# Patient Record
Sex: FEMALE | Race: ASIAN | NOT HISPANIC OR LATINO | Employment: UNEMPLOYED | URBAN - METROPOLITAN AREA
[De-identification: names, ages, dates, MRNs, and addresses within clinical notes are randomized per-mention and may not be internally consistent; named-entity substitution may affect disease eponyms.]

---

## 2019-01-04 ENCOUNTER — WALK IN (OUTPATIENT)
Dept: URGENT CARE | Age: 39
End: 2019-01-04

## 2019-01-04 DIAGNOSIS — Z23 FLU VACCINE NEED: Primary | ICD-10-CM

## 2019-01-04 PROCEDURE — 90471 IMMUNIZATION ADMIN: CPT | Performed by: REGISTERED NURSE

## 2019-01-04 PROCEDURE — 90688 IIV4 VACCINE SPLT 0.5 ML IM: CPT | Performed by: REGISTERED NURSE

## 2021-10-20 ENCOUNTER — HOSPITAL ENCOUNTER (EMERGENCY)
Facility: HOSPITAL | Age: 41
Discharge: STILL ADMITTED OR ADMINISTRATIVE | End: 2021-10-21

## 2021-10-20 ENCOUNTER — HOSPITAL ENCOUNTER (EMERGENCY)
Facility: HOSPITAL | Age: 41
Discharge: HOME | End: 2021-10-21
Attending: STUDENT IN AN ORGANIZED HEALTH CARE EDUCATION/TRAINING PROGRAM
Payer: OTHER GOVERNMENT

## 2021-10-20 ENCOUNTER — APPOINTMENT (OUTPATIENT)
Dept: MRI IMAGING | Facility: HOSPITAL | Age: 41
End: 2021-10-20
Attending: STUDENT IN AN ORGANIZED HEALTH CARE EDUCATION/TRAINING PROGRAM
Payer: OTHER GOVERNMENT

## 2021-10-20 DIAGNOSIS — R53.1 WEAKNESS: ICD-10-CM

## 2021-10-20 DIAGNOSIS — E87.6 HYPOKALEMIA: ICD-10-CM

## 2021-10-20 DIAGNOSIS — R20.2 PARESTHESIAS: Primary | ICD-10-CM

## 2021-10-20 PROCEDURE — 85025 COMPLETE CBC W/AUTO DIFF WBC: CPT | Performed by: STUDENT IN AN ORGANIZED HEALTH CARE EDUCATION/TRAINING PROGRAM

## 2021-10-20 PROCEDURE — 82077 ASSAY SPEC XCP UR&BREATH IA: CPT | Performed by: STUDENT IN AN ORGANIZED HEALTH CARE EDUCATION/TRAINING PROGRAM

## 2021-10-20 PROCEDURE — 83735 ASSAY OF MAGNESIUM: CPT | Performed by: STUDENT IN AN ORGANIZED HEALTH CARE EDUCATION/TRAINING PROGRAM

## 2021-10-20 PROCEDURE — 70549 MR ANGIOGRAPH NECK W/O&W/DYE: CPT | Performed by: STUDENT IN AN ORGANIZED HEALTH CARE EDUCATION/TRAINING PROGRAM

## 2021-10-20 PROCEDURE — A9575 INJ GADOTERATE MEGLUMI 0.1ML: HCPCS | Performed by: STUDENT IN AN ORGANIZED HEALTH CARE EDUCATION/TRAINING PROGRAM

## 2021-10-20 PROCEDURE — 70546 MR ANGIOGRAPH HEAD W/O&W/DYE: CPT | Performed by: STUDENT IN AN ORGANIZED HEALTH CARE EDUCATION/TRAINING PROGRAM

## 2021-10-20 PROCEDURE — 83690 ASSAY OF LIPASE: CPT | Performed by: STUDENT IN AN ORGANIZED HEALTH CARE EDUCATION/TRAINING PROGRAM

## 2021-10-20 PROCEDURE — 80053 COMPREHEN METABOLIC PANEL: CPT | Performed by: STUDENT IN AN ORGANIZED HEALTH CARE EDUCATION/TRAINING PROGRAM

## 2021-10-20 PROCEDURE — 99285 EMERGENCY DEPT VISIT HI MDM: CPT

## 2021-10-20 PROCEDURE — 70553 MRI BRAIN STEM W/O & W/DYE: CPT | Performed by: STUDENT IN AN ORGANIZED HEALTH CARE EDUCATION/TRAINING PROGRAM

## 2021-10-20 PROCEDURE — 96360 HYDRATION IV INFUSION INIT: CPT

## 2021-10-20 PROCEDURE — 99284 EMERGENCY DEPT VISIT MOD MDM: CPT

## 2021-10-20 PROCEDURE — 96361 HYDRATE IV INFUSION ADD-ON: CPT

## 2021-10-20 RX ORDER — POTASSIUM CHLORIDE 20 MEQ/1
40 TABLET, EXTENDED RELEASE ORAL ONCE
Status: COMPLETED | OUTPATIENT
Start: 2021-10-20 | End: 2021-10-20

## 2021-10-20 RX ORDER — SODIUM CHLORIDE 9 MG/ML
125 INJECTION, SOLUTION INTRAVENOUS CONTINUOUS
Status: DISCONTINUED | OUTPATIENT
Start: 2021-10-20 | End: 2021-10-21

## 2021-10-20 RX ORDER — LORAZEPAM 1 MG/1
1 TABLET ORAL ONCE
Status: COMPLETED | OUTPATIENT
Start: 2021-10-20 | End: 2021-10-20

## 2021-10-21 VITALS
SYSTOLIC BLOOD PRESSURE: 111 MMHG | TEMPERATURE: 98 F | BODY MASS INDEX: 22.66 KG/M2 | HEIGHT: 61 IN | RESPIRATION RATE: 21 BRPM | OXYGEN SATURATION: 97 % | DIASTOLIC BLOOD PRESSURE: 81 MMHG | WEIGHT: 120 LBS | HEART RATE: 81 BPM

## 2021-10-21 NOTE — ED PROVIDER NOTES
Patient Seen in: [facility] Emergency Department      History   Patient presents with:   Other    Stated Complaint: Shortness of breath, pt states body is not able to move    Subjective:   HPI    Patient is a 14-year-old female who had been recoverin patient is oriented to person, place, and time, appears well-developed and well-nourished. HENT:   Head: Normocephalic. Right Ear: External ear normal. No hemotympanum. Left Ear: External ear normal. No hemotympanum.    Nose: Nose normal.   Mouth/Thro Abnormal            Final result                 Please view results for these tests on the individual orders.    RAPID SARS-COV-2 BY PCR           MDM      Extensive differential diagnosis was considered for the patient including TIA, CVA, brain mass, and

## 2021-10-21 NOTE — ED INITIAL ASSESSMENT (HPI)
PT PRESENTS TO ED WITH COMPLAINT THAT SHE FEELS LIKE SHE IS BEING ELECTRICALLY SHOCKED. PT STATES SHE IS UNABLE TO MOVE OR TALK, PT IS ABLE TO MOVE ALL EXTREMITIES AND IS VERBAL UPON ARRIVAL. DENIES PAIN, DENIES SHORTNESS OF BREATH.

## 2022-05-05 ENCOUNTER — HOSPITAL ENCOUNTER (INPATIENT)
Facility: HOSPITAL | Age: 42
LOS: 1 days | Discharge: HOME | End: 2022-05-06
Attending: EMERGENCY MEDICINE | Admitting: HOSPITALIST
Payer: OTHER GOVERNMENT

## 2022-05-05 ENCOUNTER — APPOINTMENT (OUTPATIENT)
Dept: GENERAL RADIOLOGY | Facility: HOSPITAL | Age: 42
End: 2022-05-05
Attending: EMERGENCY MEDICINE
Payer: OTHER GOVERNMENT

## 2022-05-05 ENCOUNTER — APPOINTMENT (OUTPATIENT)
Dept: MRI IMAGING | Facility: HOSPITAL | Age: 42
End: 2022-05-05
Attending: EMERGENCY MEDICINE
Payer: OTHER GOVERNMENT

## 2022-05-05 ENCOUNTER — APPOINTMENT (OUTPATIENT)
Dept: CT IMAGING | Facility: HOSPITAL | Age: 42
End: 2022-05-05
Attending: EMERGENCY MEDICINE
Payer: OTHER GOVERNMENT

## 2022-05-05 DIAGNOSIS — R90.89 MIDLINE SHIFT OF BRAIN: ICD-10-CM

## 2022-05-05 DIAGNOSIS — R29.898 LEFT LEG WEAKNESS: ICD-10-CM

## 2022-05-05 DIAGNOSIS — G93.89 BRAIN MASS: Primary | ICD-10-CM

## 2022-05-05 DIAGNOSIS — R20.0 LEFT SIDED NUMBNESS: ICD-10-CM

## 2022-05-05 PROBLEM — E87.6 HYPOKALEMIA: Status: ACTIVE | Noted: 2022-05-05

## 2022-05-05 LAB
ALBUMIN SERPL-MCNC: 4 G/DL (ref 3.4–5)
ALBUMIN/GLOB SERPL: 1 {RATIO} (ref 1–2)
ALP LIVER SERPL-CCNC: 66 U/L
ALT SERPL-CCNC: 23 U/L
ANION GAP SERPL CALC-SCNC: 1 MMOL/L (ref 0–18)
APTT PPP: 31.4 SECONDS (ref 23.3–35.6)
AST SERPL-CCNC: 13 U/L (ref 15–37)
ATRIAL RATE: 76 BPM
B-HCG UR QL: NEGATIVE
BASOPHILS # BLD AUTO: 0.04 X10(3) UL (ref 0–0.2)
BASOPHILS NFR BLD AUTO: 0.6 %
BILIRUB SERPL-MCNC: 0.5 MG/DL (ref 0.1–2)
BILIRUB UR QL STRIP.AUTO: NEGATIVE
BUN BLD-MCNC: 5 MG/DL (ref 7–18)
CALCIUM BLD-MCNC: 9.3 MG/DL (ref 8.5–10.1)
CHLORIDE SERPL-SCNC: 103 MMOL/L (ref 98–112)
CK SERPL-CCNC: 127 U/L
CLARITY UR REFRACT.AUTO: CLEAR
CO2 SERPL-SCNC: 32 MMOL/L (ref 21–32)
CREAT BLD-MCNC: 0.65 MG/DL
EOSINOPHIL # BLD AUTO: 0.22 X10(3) UL (ref 0–0.7)
EOSINOPHIL NFR BLD AUTO: 3.1 %
ERYTHROCYTE [DISTWIDTH] IN BLOOD BY AUTOMATED COUNT: 15.2 %
GLOBULIN PLAS-MCNC: 4.2 G/DL (ref 2.8–4.4)
GLUCOSE BLD-MCNC: 97 MG/DL (ref 70–99)
GLUCOSE UR STRIP.AUTO-MCNC: NEGATIVE MG/DL
HCT VFR BLD AUTO: 38.6 %
HGB BLD-MCNC: 12 G/DL
IMM GRANULOCYTES # BLD AUTO: 0.01 X10(3) UL (ref 0–1)
IMM GRANULOCYTES NFR BLD: 0.1 %
INR BLD: 1.07 (ref 0.8–1.2)
KETONES UR STRIP.AUTO-MCNC: NEGATIVE MG/DL
LEUKOCYTE ESTERASE UR QL STRIP.AUTO: NEGATIVE
LYMPHOCYTES # BLD AUTO: 1.84 X10(3) UL (ref 1–4)
LYMPHOCYTES NFR BLD AUTO: 25.7 %
MCH RBC QN AUTO: 26.4 PG (ref 26–34)
MCHC RBC AUTO-ENTMCNC: 31.1 G/DL (ref 31–37)
MCV RBC AUTO: 85 FL
MONOCYTES # BLD AUTO: 0.47 X10(3) UL (ref 0.1–1)
MONOCYTES NFR BLD AUTO: 6.6 %
NEUTROPHILS # BLD AUTO: 4.57 X10 (3) UL (ref 1.5–7.7)
NEUTROPHILS # BLD AUTO: 4.57 X10(3) UL (ref 1.5–7.7)
NEUTROPHILS NFR BLD AUTO: 63.9 %
NITRITE UR QL STRIP.AUTO: NEGATIVE
OSMOLALITY SERPL CALC.SUM OF ELEC: 279 MOSM/KG (ref 275–295)
P AXIS: 57 DEGREES
P-R INTERVAL: 150 MS
PH UR STRIP.AUTO: 7 [PH] (ref 5–8)
PLATELET # BLD AUTO: 389 10(3)UL (ref 150–450)
POTASSIUM SERPL-SCNC: 3.5 MMOL/L (ref 3.5–5.1)
PROT SERPL-MCNC: 8.2 G/DL (ref 6.4–8.2)
PROT UR STRIP.AUTO-MCNC: NEGATIVE MG/DL
PROTHROMBIN TIME: 14 SECONDS (ref 11.6–14.8)
Q-T INTERVAL: 364 MS
QRS DURATION: 82 MS
QTC CALCULATION (BEZET): 409 MS
R AXIS: -5 DEGREES
RBC # BLD AUTO: 4.54 X10(6)UL
SARS-COV-2 RNA RESP QL NAA+PROBE: NOT DETECTED
SODIUM SERPL-SCNC: 136 MMOL/L (ref 136–145)
SP GR UR STRIP.AUTO: 1 (ref 1–1.03)
T AXIS: 31 DEGREES
TROPONIN I HIGH SENSITIVITY: 9 NG/L
UROBILINOGEN UR STRIP.AUTO-MCNC: <2 MG/DL
VENTRICULAR RATE: 76 BPM
WBC # BLD AUTO: 7.2 X10(3) UL (ref 4–11)

## 2022-05-05 PROCEDURE — 99223 1ST HOSP IP/OBS HIGH 75: CPT | Performed by: HOSPITALIST

## 2022-05-05 PROCEDURE — 70450 CT HEAD/BRAIN W/O DYE: CPT | Performed by: EMERGENCY MEDICINE

## 2022-05-05 PROCEDURE — 71045 X-RAY EXAM CHEST 1 VIEW: CPT | Performed by: EMERGENCY MEDICINE

## 2022-05-05 PROCEDURE — 70553 MRI BRAIN STEM W/O & W/DYE: CPT | Performed by: EMERGENCY MEDICINE

## 2022-05-05 RX ORDER — MELATONIN
3 NIGHTLY PRN
Status: DISCONTINUED | OUTPATIENT
Start: 2022-05-05 | End: 2022-05-06

## 2022-05-05 RX ORDER — HYDROXYZINE HYDROCHLORIDE 25 MG/1
25 TABLET, FILM COATED ORAL 2 TIMES DAILY PRN
COMMUNITY

## 2022-05-05 RX ORDER — DEXAMETHASONE SODIUM PHOSPHATE 4 MG/ML
10 VIAL (ML) INJECTION ONCE
Status: COMPLETED | OUTPATIENT
Start: 2022-05-05 | End: 2022-05-05

## 2022-05-05 RX ORDER — ESCITALOPRAM OXALATE 10 MG/1
10 TABLET ORAL DAILY
COMMUNITY

## 2022-05-05 RX ORDER — DEXAMETHASONE SODIUM PHOSPHATE 4 MG/ML
4 VIAL (ML) INJECTION EVERY 12 HOURS
Status: DISCONTINUED | OUTPATIENT
Start: 2022-05-06 | End: 2022-05-06

## 2022-05-05 RX ORDER — ONDANSETRON 2 MG/ML
4 INJECTION INTRAMUSCULAR; INTRAVENOUS EVERY 6 HOURS PRN
Status: DISCONTINUED | OUTPATIENT
Start: 2022-05-05 | End: 2022-05-06

## 2022-05-05 RX ORDER — PROCHLORPERAZINE EDISYLATE 5 MG/ML
5 INJECTION INTRAMUSCULAR; INTRAVENOUS EVERY 8 HOURS PRN
Status: DISCONTINUED | OUTPATIENT
Start: 2022-05-05 | End: 2022-05-06

## 2022-05-05 RX ORDER — ACETAMINOPHEN 325 MG/1
650 TABLET ORAL EVERY 6 HOURS PRN
Status: DISCONTINUED | OUTPATIENT
Start: 2022-05-05 | End: 2022-05-06

## 2022-05-05 RX ORDER — ESCITALOPRAM OXALATE 10 MG/1
10 TABLET ORAL DAILY
Status: DISCONTINUED | OUTPATIENT
Start: 2022-05-05 | End: 2022-05-06

## 2022-05-05 NOTE — ED QUICK NOTES
Orders for admission, patient is aware of plan and ready to go upstairs. Any questions, please call ED RN cirilo at extension [phone].      Patient Covid vaccination status: Fully vaccinated     COVID Test Ordered in ED: Rapid SARS-CoV-2 by PCR was negative    COVID Suspicion at Admission: Low clinical suspicion for COVID    Running Infusions:  None    Mental Status/LOC at time of transport: a&ox3    Other pertinent information:   CIWA score: N/A   NIH score:  2     PT 56 Vilma Nath

## 2022-05-06 VITALS
RESPIRATION RATE: 20 BRPM | SYSTOLIC BLOOD PRESSURE: 123 MMHG | OXYGEN SATURATION: 95 % | BODY MASS INDEX: 21.71 KG/M2 | WEIGHT: 115 LBS | HEART RATE: 82 BPM | TEMPERATURE: 98 F | DIASTOLIC BLOOD PRESSURE: 84 MMHG | HEIGHT: 61 IN

## 2022-05-06 PROCEDURE — 99221 1ST HOSP IP/OBS SF/LOW 40: CPT

## 2022-05-06 PROCEDURE — 99239 HOSP IP/OBS DSCHRG MGMT >30: CPT | Performed by: HOSPITALIST

## 2022-05-06 RX ORDER — DEXAMETHASONE 4 MG/1
4 TABLET ORAL EVERY 12 HOURS SCHEDULED
Qty: 30 TABLET | Refills: 0 | Status: SHIPPED | OUTPATIENT
Start: 2022-05-06 | End: 2022-05-21

## 2022-05-06 RX ORDER — DEXAMETHASONE 4 MG/1
4 TABLET ORAL EVERY 12 HOURS SCHEDULED
Status: DISCONTINUED | OUTPATIENT
Start: 2022-05-06 | End: 2022-05-06

## 2022-05-06 NOTE — PLAN OF CARE
NURSING DISCHARGE NOTE    Discharged Home via Ambulatory. Accompanied by Family member and Support staff  Belongings Taken by patient/family. Assumed care @0521  VSS,   A&Ox4, neuro q4 with left sided numbness, tingling in L foot. Seizure precautions in place. RA    NSR ,   Denies Pain,   Up with standby assist as tolerated. Tolerating Regular diet. Intake and outputs w/d/l. Discharge navigator complete. Discussed medications and upcoming appointments. All questions answered. Problem: NEUROLOGICAL - ADULT  Goal: Achieves stable or improved neurological status  Description: INTERVENTIONS  - Assess for and report changes in neurological status  - Initiate measures to prevent increased intracranial pressure  - Maintain blood pressure and fluid volume within ordered parameters to optimize cerebral perfusion and minimize risk of hemorrhage  - Monitor temperature, glucose, and sodium.  Initiate appropriate interventions as ordered  Outcome: Adequate for Discharge  Goal: Absence of seizures  Description: INTERVENTIONS  - Monitor for seizure activity  - Administer anti-seizure medications as ordered  - Monitor neurological status  Outcome: Adequate for Discharge

## 2022-05-06 NOTE — PLAN OF CARE
NURSING ADMISSION NOTE      Patient admitted via Cart  Oriented to room. Safety precautions initiated. Bed in low position. Call light in reach. Assumed care of pt around 1900. A/O x4. RA. NSR to ST in the 110's on tele. Denies any pain at this time. Neuros q4hr. Full assessment see flowsheets. Numbness to L side, L sided drift. Seizure precautions in place. Pt is currently resting in bed w/ call light within reach. Will continue to monitor. Problem: NEUROLOGICAL - ADULT  Goal: Achieves stable or improved neurological status  Description: INTERVENTIONS  - Assess for and report changes in neurological status  - Initiate measures to prevent increased intracranial pressure  - Maintain blood pressure and fluid volume within ordered parameters to optimize cerebral perfusion and minimize risk of hemorrhage  - Monitor temperature, glucose, and sodium.  Initiate appropriate interventions as ordered  Outcome: Progressing  Goal: Absence of seizures  Description: INTERVENTIONS  - Monitor for seizure activity  - Administer anti-seizure medications as ordered  - Monitor neurological status  Outcome: Progressing

## 2022-05-06 NOTE — ED QUICK NOTES
PT HAS A Islam FRIEND WATCHING HER KIDS TONIGHT AND HER  SHOULD BE FLYING HOME TOMORROW MORNING AT [place]

## 2022-05-09 NOTE — PAYOR COMM NOTE
Discharge Notification    Patient Name: Domenico CARBAJAL  Subscriber #: [id number]  Authorization Number: [id number]  Admit Date/Time: 5/5/2022 1:04 PM  Discharge Date/Time: 5/6/2022 2:40 PM

## 2022-05-12 ENCOUNTER — HOSPITAL ENCOUNTER (OUTPATIENT)
Dept: MRI IMAGING | Facility: HOSPITAL | Age: 42
Discharge: HOME | End: 2022-05-12
Attending: NURSE PRACTITIONER
Payer: OTHER GOVERNMENT

## 2022-05-12 ENCOUNTER — TELEPHONE (OUTPATIENT)
Dept: SURGERY | Facility: CLINIC | Age: 42
End: 2022-05-12

## 2022-05-12 DIAGNOSIS — G93.89 BRAIN MASS: ICD-10-CM

## 2022-05-12 PROCEDURE — A9575 INJ GADOTERATE MEGLUMI 0.1ML: HCPCS | Performed by: NURSE PRACTITIONER

## 2022-05-12 PROCEDURE — 70555 FMRI BRAIN BY PHYS/PSYCH: CPT | Performed by: NURSE PRACTITIONER

## 2022-05-12 NOTE — TELEPHONE ENCOUNTER
Functional Mri is pending read from radiology.   We will be in touch after we have those results to discuss the next steps

## 2022-06-10 ENCOUNTER — TELEPHONE (OUTPATIENT)
Dept: SURGERY | Facility: CLINIC | Age: 42
End: 2022-06-10

## 2022-06-10 NOTE — TELEPHONE ENCOUNTER
Received FMLA paperwork from [facility]. Placed in [address] [place] folder in nurse bin for provider completion.

## 2022-06-16 ENCOUNTER — TELEPHONE (OUTPATIENT)
Dept: SURGERY | Facility: CLINIC | Age: 42
End: 2022-06-16

## 2022-06-16 NOTE — TELEPHONE ENCOUNTER
Per Dr. Luis Dawkins, Dr. Malcolm Turner from [place] reviewed imaging for this patient. Pt was recommended to see Dr. Kimber Tapia and Dr. Reyes Session this Tuesday for consultation. Can you help get this set up?

## 2022-06-16 NOTE — TELEPHONE ENCOUNTER
Discussed with Dr. Billie Edwards who discussed patient with Dr. Harry Barnhart. Pt is recommended to see Dr. Dionicio Caal and Dr. Connie Koehler this Tuesday for consultation. I have reached out to the oncology NP to help set this up. She can drop the paperwork off for us to fill out up until her appointment with them as there is no neurosurgical interventions we can offer.   Either her PCP or oncology can take over any additional paperwork from there

## 2022-06-16 NOTE — TELEPHONE ENCOUNTER
Called patient and spoke to her  Carmen Stanley (on [employer] release). Explained that although we consulted the patient in the hospital, she does not need to follow up with neurosurgery as we are not treating her. Told him she needs to make an appointment with neurology and oncology and we can offer to fill out [facility] paperwork up until her appointment. Offered to transfer call to  to schedule with neurology. He will call back if he decides to have us fill out paperwork.

## 2022-06-21 ENCOUNTER — HOSPITAL ENCOUNTER (OUTPATIENT)
Dept: RADIATION ONCOLOGY | Facility: HOSPITAL | Age: 42
Discharge: HOME | End: 2022-06-21
Attending: RADIOLOGY
Payer: OTHER GOVERNMENT

## 2022-06-21 ENCOUNTER — HOSPITAL ENCOUNTER (OUTPATIENT)
Dept: MRI IMAGING | Facility: HOSPITAL | Age: 42
Discharge: HOME | End: 2022-06-21
Attending: RADIOLOGY
Payer: OTHER GOVERNMENT

## 2022-06-21 ENCOUNTER — APPOINTMENT (OUTPATIENT)
Dept: RADIATION ONCOLOGY | Facility: HOSPITAL | Age: 42
End: 2022-06-21
Attending: RADIOLOGY
Payer: OTHER GOVERNMENT

## 2022-06-21 ENCOUNTER — SOCIAL WORK SERVICES (OUTPATIENT)
Dept: HEMATOLOGY/ONCOLOGY | Facility: HOSPITAL | Age: 42
End: 2022-06-21

## 2022-06-21 ENCOUNTER — OFFICE VISIT (OUTPATIENT)
Dept: HEMATOLOGY/ONCOLOGY | Facility: HOSPITAL | Age: 42
End: 2022-06-21
Attending: INTERNAL MEDICINE
Payer: OTHER GOVERNMENT

## 2022-06-21 VITALS
BODY MASS INDEX: 19.83 KG/M2 | RESPIRATION RATE: 18 BRPM | HEART RATE: 90 BPM | SYSTOLIC BLOOD PRESSURE: 124 MMHG | HEIGHT: 61 IN | WEIGHT: 105 LBS | TEMPERATURE: 100 F | DIASTOLIC BLOOD PRESSURE: 88 MMHG

## 2022-06-21 DIAGNOSIS — C71.9 GLIOBLASTOMA (HCC): Primary | ICD-10-CM

## 2022-06-21 DIAGNOSIS — C71.9 GLIOBLASTOMA (HCC): ICD-10-CM

## 2022-06-21 PROCEDURE — 70553 MRI BRAIN STEM W/O & W/DYE: CPT | Performed by: RADIOLOGY

## 2022-06-21 PROCEDURE — 99205 OFFICE O/P NEW HI 60 MIN: CPT | Performed by: INTERNAL MEDICINE

## 2022-06-21 PROCEDURE — 99214 OFFICE O/P EST MOD 30 MIN: CPT

## 2022-06-21 PROCEDURE — A9575 INJ GADOTERATE MEGLUMI 0.1ML: HCPCS | Performed by: RADIOLOGY

## 2022-06-21 RX ORDER — HYDROCODONE BITARTRATE AND ACETAMINOPHEN 5; 325 MG/1; MG/1
1 TABLET ORAL EVERY 4 HOURS PRN
COMMUNITY
Start: 2022-06-17

## 2022-06-21 RX ORDER — TEMOZOLOMIDE 5 MG/1
10 CAPSULE ORAL DAILY
Qty: 84 CAPSULE | Refills: 0 | Status: SHIPPED | OUTPATIENT
Start: 2022-06-21 | End: 2022-08-02

## 2022-06-21 RX ORDER — ONDANSETRON HYDROCHLORIDE 8 MG/1
8 TABLET, FILM COATED ORAL EVERY 8 HOURS PRN
Qty: 30 TABLET | Refills: 3 | Status: SHIPPED | OUTPATIENT
Start: 2022-06-21

## 2022-06-21 RX ORDER — ALPRAZOLAM 0.25 MG/1
TABLET ORAL
Qty: 10 TABLET | Refills: 0 | Status: SHIPPED | OUTPATIENT
Start: 2022-06-21

## 2022-06-21 RX ORDER — LEVETIRACETAM 1000 MG/1
1000 TABLET ORAL 2 TIMES DAILY
COMMUNITY
Start: 2022-06-16

## 2022-06-21 RX ORDER — PANTOPRAZOLE SODIUM 40 MG/1
40 TABLET, DELAYED RELEASE ORAL
COMMUNITY
Start: 2022-06-16

## 2022-06-21 RX ORDER — GABAPENTIN 300 MG/1
300 CAPSULE ORAL 2 TIMES DAILY PRN
COMMUNITY
Start: 2022-06-16

## 2022-06-21 RX ORDER — DEXAMETHASONE 2 MG/1
2 TABLET ORAL 2 TIMES DAILY WITH MEALS
COMMUNITY
Start: 2022-06-17

## 2022-06-21 RX ORDER — TEMOZOLOMIDE 100 MG/1
100 CAPSULE ORAL DAILY
Qty: 42 CAPSULE | Refills: 0 | Status: SHIPPED | OUTPATIENT
Start: 2022-06-21 | End: 2022-08-02

## 2022-06-21 RX ORDER — SULFAMETHOXAZOLE AND TRIMETHOPRIM 800; 160 MG/1; MG/1
1 TABLET ORAL ONCE
Qty: 18 TABLET | Refills: 1 | Status: SHIPPED | OUTPATIENT
Start: 2022-06-21 | End: 2022-06-21

## 2022-06-21 NOTE — CONSULTS
[facility] RADIATION ONCOLOGY CONSULTATION     PATIENT:   Ciara Diaz MD:  Alex Ley MD      DIAGNOSIS:   Glioblastoma, right frontal lobe       CC:    Right frontal lobe GBM    HPI   44-year-old woman with her . Presented to the ED in early May with headache and left-sided numbness and weakness. CT shows a hypodense lesion centered in the right posterior frontal lobe with mass-effect and edema. MRI brain 5/5/2022 shows a large necrotic infiltrative enhancing mass centered in the right frontal lobe, right basal ganglia, right caudate tail, right insula, into the superior/anterior right temporal lobe. FLAIR abnormality measures 7 x 5.8 cm. Enhancing component measures 4.5 x 3.9 cm. Functional MRI 5/12/2022 shows left sided language and tumor involving the right corticospinal tract. Right side awake craniotomy and max safe subtotal resection 5/24/2022, Dr. Radha Vance at [facility]. 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 Postoperative MRI 5/25/2022 shows a few foci of residual enhancement/tumor along the inferior aspect of the resection cavity, as well as along the anterosuperior aspect. Stable enhancing satellite lesions in the right subinsular white matter. And anterior to the resection cavity as well. Stable extensive confluent expansile FLAIR abnormality involving the right frontal lobe, right parietal lobe, right insula, right basal ganglia, right thalamus, right anterior temporal lobe consistent with postoperative changes/nonenhancing tumor. She went to [facility] for rehab. She is on Keppra for seizure control. She is now back at home. On Decadron 2 mg twice a day. On gabapentin and Keppra. On Protonix. Slowly improving left-sided weakness. Speech normal.  No recent seizure activity.     PMH  -Glioblastoma    PSH  -Craniotomy and resection    MEDS  -Decadron 2 mg twice daily, gabapentin, Norco as needed, Keppra, Zofran as needed, Protonix, Bactrim 3 times a week    No Known Allergies     SH  -Does not smoke, , lives in [facility], 2 children ages 15 and 6    FH  -Negative for brain tumors    ROS  -No significant headache, residual left-sided weakness, no bowel or bladder complaints, no seizure activity    PHYSICAL EXAM   ECO  GEN:  No acute distress. HEENT:  Postoperative site without infection. CHEST:  Regular rate and rhythm. ABDOMEN: Soft, non-tender. EXT:  No edema. NEURO:  Alert, oriented. Mild LUE, LLE weakness. Speech slow but clear. IMPRESSION:   29-year-old woman status post right craniotomy and subtotal resection of a large glioblastoma involving the right frontal lobe, insula, basal ganglia, caudate, temporal lobe, thalamus. Postoperative imaging reveals resection of the vast majority of the enhancing portion but significant residual nonenhancing portion. IDH1 is wild-type but MGMT is methylated. Case discussed in brain tumor board. Plan standard radiation therapy and concurrent temozolomide. She will have 6 months of adjuvant temozolomide. She will consider TTF down the road. Goal will be for local control and delayed time to progression. Radiation and chemo will not help her recover neurological per se, but will prevent disease progression so she can recover gradually with time and rehab. Radiation will cause some right sided hair loss. Will keep her on low dose steroids since she has residual tumor and to contain RT induced edema. SSNHL on the right to some degree is possible. PLAN:   -MRI for RT planning asap  -CT simulation tomorrow in [place]  -start RT/Temodar asap  -46 Gy to preop FLAIR; 14 Gy boost to cavity + enhancing residual  -Temodar 7 days/week  -RT Mon-Fri, 30 fractions, 6 weeks  -MRI q 2 months post RT  -consider TTF, adjuvant Temodar, physical therapy    Cheri Webster MD  Radiation Oncology    CC:  MD Jeanie Jessica MD PhD

## 2022-06-21 NOTE — PROGRESS NOTES
Pt scored a 8 on the PHQ (0 on number 9). Pt reports to RN that she is feeling overwhelmed/anxious with new dx but declining SW at this time. Pt's  is supportive and encouraging. Pt states that her anxiety is related to new Glioblastoma dx with having 3 children at home. SW confirmed that pt is not suicidal and has contact information for SW if further resources/support is needed. ANNABEL WhiteW   at [facility]    [address] [facility], [address] [place] [address], Tia, [address] [place] [address] Yariel Delarosa [address] [place] [address] Dilan  Ph: [phone]. [email or web address]  Fax: [phone]

## 2022-06-21 NOTE — PATIENT INSTRUCTIONS
- CT SIMULATION FOR RADIATION PLANNING SCHEDULED FOR TOMORROW (JUNE 22, WEDNESDAY) AT 1PM AT . [address] AT [phone] TO SCHEDULE MRI OF BRAIN ASAP     - IF YOU HAVE ANY QUESTIONS OR CONCERNS REGARDING RADIATION THERAPY, PLEASE CALL [phone].

## 2022-06-21 NOTE — PATIENT INSTRUCTIONS
Treatment Plan:    Chemotherapy and radiation for 6 weeks. -The chemo is Temodar, which is taken every day. -Radiation is Monday through Friday. Then 1 month off    Then start Chemotherapy by itself. - The chemo is still Temodar, but the schedule is different and the dose is different. - During this time, you take Temodar for 5 days in a row, and then 23 days off,  - We do this for 6 months and then stop. Also available is [employer] TTF. Please visit [employer] to see information on this device. It would start when we start the chemo by itself. It continues until the tumor grows.

## 2022-06-21 NOTE — PROGRESS NOTES
Outpatient Oncology Care Plan  Problem list:  knowledge deficit    Problems related to:    disease/disease progression    Interventions:  provided general teaching    Expected outcomes:  understands plan of care    Progress towards outcome:  making progress    Education Record    Learner:  Patient and Family Member  Barriers / Limitations:  Language  Method:  Brief focused   Outcome:  Shows understanding  Comments:interpretor offered and declined  New consult in neuro clinic. Pt in a wheelchair accompanied by her .

## 2022-06-22 ENCOUNTER — HOSPITAL ENCOUNTER (OUTPATIENT)
Dept: RADIATION ONCOLOGY | Age: 42
Discharge: HOME | End: 2022-06-22
Attending: INTERNAL MEDICINE
Payer: OTHER GOVERNMENT

## 2022-06-22 PROCEDURE — 77334 RADIATION TREATMENT AID(S): CPT | Performed by: RADIOLOGY

## 2022-06-23 ENCOUNTER — SOCIAL WORK SERVICES (OUTPATIENT)
Dept: HEMATOLOGY/ONCOLOGY | Facility: HOSPITAL | Age: 42
End: 2022-06-23

## 2022-06-23 PROCEDURE — 77470 SPECIAL RADIATION TREATMENT: CPT | Performed by: RADIOLOGY

## 2022-06-23 PROCEDURE — 77399 UNLISTED PX MED RADJ PHYSICS: CPT | Performed by: RADIOLOGY

## 2022-06-23 NOTE — PROGRESS NOTES
spoke with  Maya Pack and provided education on DME; family requesting Hospital Bed. Waiting for note from MD and will complete order. Also sent email to  providing info and education on [employer] Compliance process.  to remain available.

## 2022-06-24 NOTE — PROGRESS NOTES
On 6/21/22 I had a face to face evaluation with Ruma Diaz for a semi electric hospital bed medical necessity evaluation. Patient has a Glioblastoma. A variable height bed surface is required to permit safe transfers from the hospital bed to a chair, wheelchair, or for stand and pivot transfer. Patient requires frequent changes in body position and  has an immediate need for change in body position. Patient is not able to reposition herself and needs to bed to alleviate pain. It is in my opinion that a semi-electric hospital bed is reasonable and necessary. Keren Velazco M.D.   [facility]  Co-Medical Director, [facility]

## 2022-06-28 ENCOUNTER — SOCIAL WORK SERVICES (OUTPATIENT)
Dept: HEMATOLOGY/ONCOLOGY | Facility: HOSPITAL | Age: 42
End: 2022-06-28

## 2022-06-28 NOTE — PROGRESS NOTES
faxed Hospital Bed order to Alli Molina at [facility] (Q#[phone] S#[phone]), who connected with Family and confirmed that they will be delivering 6/30 between 9a and 12p

## 2022-06-30 ENCOUNTER — OFFICE VISIT (OUTPATIENT)
Dept: HEMATOLOGY/ONCOLOGY | Facility: HOSPITAL | Age: 42
End: 2022-06-30
Attending: INTERNAL MEDICINE
Payer: OTHER GOVERNMENT

## 2022-06-30 ENCOUNTER — HOSPITAL ENCOUNTER (OUTPATIENT)
Dept: RADIATION ONCOLOGY | Facility: HOSPITAL | Age: 42
Discharge: HOME | End: 2022-06-30
Attending: RADIOLOGY
Payer: OTHER GOVERNMENT

## 2022-06-30 DIAGNOSIS — Z71.89 OTHER SPECIFIED COUNSELING: ICD-10-CM

## 2022-06-30 DIAGNOSIS — C71.9 GBM (GLIOBLASTOMA MULTIFORME) (HCC): Primary | ICD-10-CM

## 2022-06-30 DIAGNOSIS — C71.9 GLIOBLASTOMA (HCC): ICD-10-CM

## 2022-06-30 LAB
ALBUMIN SERPL-MCNC: 3.1 G/DL (ref 3.4–5)
ALBUMIN/GLOB SERPL: 0.9 {RATIO} (ref 1–2)
ALP LIVER SERPL-CCNC: 83 U/L
ALT SERPL-CCNC: 37 U/L
ANION GAP SERPL CALC-SCNC: 9 MMOL/L (ref 0–18)
AST SERPL-CCNC: 5 U/L (ref 15–37)
BASOPHILS # BLD AUTO: 0.04 X10(3) UL (ref 0–0.2)
BASOPHILS NFR BLD AUTO: 0.4 %
BILIRUB SERPL-MCNC: 0.2 MG/DL (ref 0.1–2)
BUN BLD-MCNC: 19 MG/DL (ref 7–18)
CALCIUM BLD-MCNC: 8.6 MG/DL (ref 8.5–10.1)
CHLORIDE SERPL-SCNC: 106 MMOL/L (ref 98–112)
CO2 SERPL-SCNC: 25 MMOL/L (ref 21–32)
CREAT BLD-MCNC: 0.54 MG/DL
EOSINOPHIL # BLD AUTO: 0.01 X10(3) UL (ref 0–0.7)
EOSINOPHIL NFR BLD AUTO: 0.1 %
ERYTHROCYTE [DISTWIDTH] IN BLOOD BY AUTOMATED COUNT: 17 %
FASTING STATUS PATIENT QL REPORTED: NO
GLOBULIN PLAS-MCNC: 3.3 G/DL (ref 2.8–4.4)
GLUCOSE BLD-MCNC: 148 MG/DL (ref 70–99)
HCT VFR BLD AUTO: 29.7 %
HGB BLD-MCNC: 8.9 G/DL
IMM GRANULOCYTES # BLD AUTO: 0.41 X10(3) UL (ref 0–1)
IMM GRANULOCYTES NFR BLD: 3.9 %
LYMPHOCYTES # BLD AUTO: 1.11 X10(3) UL (ref 1–4)
LYMPHOCYTES NFR BLD AUTO: 10.6 %
MCH RBC QN AUTO: 24.6 PG (ref 26–34)
MCHC RBC AUTO-ENTMCNC: 30 G/DL (ref 31–37)
MCV RBC AUTO: 82 FL
MONOCYTES # BLD AUTO: 0.45 X10(3) UL (ref 0.1–1)
MONOCYTES NFR BLD AUTO: 4.3 %
NEUTROPHILS # BLD AUTO: 8.47 X10 (3) UL (ref 1.5–7.7)
NEUTROPHILS # BLD AUTO: 8.47 X10(3) UL (ref 1.5–7.7)
NEUTROPHILS NFR BLD AUTO: 80.7 %
OSMOLALITY SERPL CALC.SUM OF ELEC: 295 MOSM/KG (ref 275–295)
PLATELET # BLD AUTO: 347 10(3)UL (ref 150–450)
POTASSIUM SERPL-SCNC: 3.6 MMOL/L (ref 3.5–5.1)
PROT SERPL-MCNC: 6.4 G/DL (ref 6.4–8.2)
RBC # BLD AUTO: 3.62 X10(6)UL
SODIUM SERPL-SCNC: 140 MMOL/L (ref 136–145)
WBC # BLD AUTO: 10.5 X10(3) UL (ref 4–11)

## 2022-06-30 PROCEDURE — 99215 OFFICE O/P EST HI 40 MIN: CPT | Performed by: CLINICAL NURSE SPECIALIST

## 2022-06-30 NOTE — PROGRESS NOTES
Patient presents with:  Pt Ed: Oral Chemo Education    Patient presents to clinic in wheelchair with spouse for Oral Chemo Education of Temodar 110mg. Medication list reviewed and patient states they do have medication Temodar present at today's appointment. Distress screening discussed and given to patient at visit.     Education Record    Learner:  Patient and Spouse    Disease / Diagnosis: Glioblastoma     Barriers / Limitations:  None   Comments:    Method:  Brief focused   Comments:    General Topics:  Medication   Comments:    Outcome:  Shows understanding   Comments:

## 2022-07-01 ENCOUNTER — SOCIAL WORK SERVICES (OUTPATIENT)
Dept: HEMATOLOGY/ONCOLOGY | Facility: HOSPITAL | Age: 42
End: 2022-07-01

## 2022-07-01 ENCOUNTER — HOSPITAL ENCOUNTER (OUTPATIENT)
Dept: RADIATION ONCOLOGY | Facility: HOSPITAL | Age: 42
Discharge: HOME | End: 2022-07-01
Attending: INTERNAL MEDICINE
Payer: OTHER GOVERNMENT

## 2022-07-01 PROCEDURE — 77386 HC IMRT COMPLEX: CPT | Performed by: INTERNAL MEDICINE

## 2022-07-01 NOTE — PROGRESS NOTES
received notice from  that Hospital Bed was delivered, but missing some parts; [employer] was to come back and deliver what was needed but never showed.  spoke with CHER at [facility] and requested he rectify the situation. Confirmed that [employer] has delivered everything needed and had contacted .

## 2022-07-05 PROCEDURE — 77386 HC IMRT COMPLEX: CPT | Performed by: INTERNAL MEDICINE

## 2022-07-06 ENCOUNTER — APPOINTMENT (OUTPATIENT)
Dept: HEMATOLOGY/ONCOLOGY | Facility: HOSPITAL | Age: 42
End: 2022-07-06
Attending: INTERNAL MEDICINE
Payer: OTHER GOVERNMENT

## 2022-07-06 PROCEDURE — 77386 HC IMRT COMPLEX: CPT | Performed by: INTERNAL MEDICINE

## 2022-07-07 ENCOUNTER — HOSPITAL ENCOUNTER (OUTPATIENT)
Dept: RADIATION ONCOLOGY | Facility: HOSPITAL | Age: 42
Discharge: HOME | End: 2022-07-07
Attending: RADIOLOGY
Payer: OTHER GOVERNMENT

## 2022-07-07 ENCOUNTER — TELEPHONE (OUTPATIENT)
Dept: HEMATOLOGY/ONCOLOGY | Facility: HOSPITAL | Age: 42
End: 2022-07-07

## 2022-07-07 ENCOUNTER — NURSE ONLY (OUTPATIENT)
Dept: HEMATOLOGY/ONCOLOGY | Facility: HOSPITAL | Age: 42
End: 2022-07-07
Attending: INTERNAL MEDICINE
Payer: OTHER GOVERNMENT

## 2022-07-07 VITALS
OXYGEN SATURATION: 98 % | TEMPERATURE: 99 F | HEART RATE: 80 BPM | RESPIRATION RATE: 16 BRPM | DIASTOLIC BLOOD PRESSURE: 79 MMHG | SYSTOLIC BLOOD PRESSURE: 116 MMHG

## 2022-07-07 DIAGNOSIS — C71.9 GLIOBLASTOMA (HCC): Primary | ICD-10-CM

## 2022-07-07 DIAGNOSIS — C71.9 GLIOBLASTOMA (HCC): ICD-10-CM

## 2022-07-07 LAB
BASOPHILS # BLD AUTO: 0.04 X10(3) UL (ref 0–0.2)
BASOPHILS NFR BLD AUTO: 0.4 %
EOSINOPHIL # BLD AUTO: 0.02 X10(3) UL (ref 0–0.7)
EOSINOPHIL NFR BLD AUTO: 0.2 %
ERYTHROCYTE [DISTWIDTH] IN BLOOD BY AUTOMATED COUNT: 17.5 %
HCT VFR BLD AUTO: 31.7 %
HGB BLD-MCNC: 9.5 G/DL
IMM GRANULOCYTES # BLD AUTO: 0.43 X10(3) UL (ref 0–1)
IMM GRANULOCYTES NFR BLD: 4.5 %
LYMPHOCYTES # BLD AUTO: 1.43 X10(3) UL (ref 1–4)
LYMPHOCYTES NFR BLD AUTO: 15 %
MCH RBC QN AUTO: 24.1 PG (ref 26–34)
MCHC RBC AUTO-ENTMCNC: 30 G/DL (ref 31–37)
MCV RBC AUTO: 80.3 FL
MONOCYTES # BLD AUTO: 0.53 X10(3) UL (ref 0.1–1)
MONOCYTES NFR BLD AUTO: 5.5 %
NEUTROPHILS # BLD AUTO: 7.11 X10 (3) UL (ref 1.5–7.7)
NEUTROPHILS # BLD AUTO: 7.11 X10(3) UL (ref 1.5–7.7)
NEUTROPHILS NFR BLD AUTO: 74.4 %
PLATELET # BLD AUTO: 324 10(3)UL (ref 150–450)
RBC # BLD AUTO: 3.95 X10(6)UL
WBC # BLD AUTO: 9.6 X10(3) UL (ref 4–11)

## 2022-07-07 PROCEDURE — 85025 COMPLETE CBC W/AUTO DIFF WBC: CPT

## 2022-07-07 PROCEDURE — 36415 COLL VENOUS BLD VENIPUNCTURE: CPT

## 2022-07-07 PROCEDURE — 77386 HC IMRT COMPLEX: CPT | Performed by: RADIOLOGY

## 2022-07-07 NOTE — TELEPHONE ENCOUNTER
calling [de-identified] allyson has been constipated for 4 days she Murelax it's not helping.  She is receiving chemo and rad/onc esau

## 2022-07-07 NOTE — TELEPHONE ENCOUNTER
Called spouse, patient has not had BM x 4 days, no vomiting or nausea, feels stool near rectum and hard. Instructed in use of some MOM, stool softeners, pushing fluids, Prune Juice, stay ambulating to promote stools.   He verbalizes understanding

## 2022-07-07 NOTE — PROGRESS NOTES
[facility] Radiation Treatment Management Note 1-5    Patient:  Caterina Del Cid  Age:  38year old  Visit Diagnosis:    1. Glioblastoma ([address]      Primary Rad/Onc:  Dr. James Reyes    Site Delivered Dose (cGy) Prescribed Dose (cGy) Fraction #   RIGHT FRONTAL 1000 4600 5/23     First treatment date:   6/30/22  Concurrent chemotherapy:  CONC. [address]    Oncology Vitals 5/6/2022 6/21/2022 7/7/2022   Height - 5' 1\" -   Height - 155 cm -   Weight - 105 lb -   Weight - 47.628 kg -   BSA (m2) - 1.44 m2 -   /84 124/88 116/79   Pulse 82 90 80   Resp 20 18 16   Temp 97.6 99.6 99.2   SpO2 95 - 98   Pain Score - 0 0   Some recent data might be hidden     Toxicities:  Fatigue Grade 0= None  Alopecia absent  Dry Skin absent  Nausea Grade 0= None  Vomiting Grade 0= None  Muscle weakness Grade 0= None  Dizziness Grade 0= None  Blurred vision Grade 0= None  Headaches Grade 0= None  Gait disturbance Grade 0= None  Thrush Grade 0= None    Nursing Note:  Recent Labs   Lab 06/30/22  1438   RBC 3.62*   HGB 8.9*   HCT 29.7*   MCV 82.0   MCH 24.6*   MCHC 30.0*   RDW 17.0   NEPRELIM 8.47*   WBC 10.5   .0     RN ED done with pt and   Pt able to answer questions appropriately, is more alert. C/O constipation for 4 days. States has urge and pressure but stools \"won't come out\". Tried Miralax without relief. Appetite good, no nausea. Cont on Decadron 2 mg BID. Taisha Mcdaniel, HARSHIL    Physician Note:  Subjective:  Constipation - takes zofran daily, fairly sedentary, no pain meds  Denies headache  Tried miralax x 2 doses over 2 days no relief      Objective:  No changes      Treatment setup imaging have been reviewed:   Yes    Assessment/Plan:  Try 1/2 dose mag citrate, then other 1/2 if no BM; let us know tomorrow AM  After BM, then miralax 1/2 dose daily to prevent constipation    Wants to get inpatient rehab at [facility] after RT -  will look into this; more likely will end up having home OT/PT or outpatient      Dr. Janusz Garrido

## 2022-07-08 PROCEDURE — 77336 RADIATION PHYSICS CONSULT: CPT | Performed by: RADIOLOGY

## 2022-07-08 PROCEDURE — 77386 HC IMRT COMPLEX: CPT | Performed by: RADIOLOGY

## 2022-07-11 PROCEDURE — 77386 HC IMRT COMPLEX: CPT | Performed by: RADIOLOGY

## 2022-07-12 PROCEDURE — 77338 DESIGN MLC DEVICE FOR IMRT: CPT | Performed by: RADIOLOGY

## 2022-07-12 PROCEDURE — 77300 RADIATION THERAPY DOSE PLAN: CPT | Performed by: RADIOLOGY

## 2022-07-12 PROCEDURE — 77386 HC IMRT COMPLEX: CPT | Performed by: RADIOLOGY

## 2022-07-13 PROCEDURE — 77386 HC IMRT COMPLEX: CPT | Performed by: RADIOLOGY

## 2022-07-14 ENCOUNTER — HOSPITAL ENCOUNTER (OUTPATIENT)
Dept: RADIATION ONCOLOGY | Facility: HOSPITAL | Age: 42
Discharge: HOME | End: 2022-07-14
Attending: RADIOLOGY
Payer: OTHER GOVERNMENT

## 2022-07-14 DIAGNOSIS — C71.9 GLIOBLASTOMA (HCC): Primary | ICD-10-CM

## 2022-07-14 PROCEDURE — 77386 HC IMRT COMPLEX: CPT | Performed by: RADIOLOGY

## 2022-07-15 ENCOUNTER — HOSPITAL ENCOUNTER (OUTPATIENT)
Dept: RADIATION ONCOLOGY | Facility: HOSPITAL | Age: 42
Discharge: HOME | End: 2022-07-15
Attending: INTERNAL MEDICINE
Payer: OTHER GOVERNMENT

## 2022-07-15 VITALS
OXYGEN SATURATION: 98 % | SYSTOLIC BLOOD PRESSURE: 119 MMHG | TEMPERATURE: 99 F | HEART RATE: 93 BPM | RESPIRATION RATE: 16 BRPM | DIASTOLIC BLOOD PRESSURE: 78 MMHG

## 2022-07-15 DIAGNOSIS — C71.9 GLIOBLASTOMA (HCC): Primary | ICD-10-CM

## 2022-07-15 PROCEDURE — 77386 HC IMRT COMPLEX: CPT | Performed by: RADIOLOGY

## 2022-07-15 PROCEDURE — 77336 RADIATION PHYSICS CONSULT: CPT | Performed by: RADIOLOGY

## 2022-07-15 NOTE — PROGRESS NOTES
[facility] Radiation Treatment Management Note 11-15    Patient:  Karyn Duarte  Age:  38year old  Visit Diagnosis:  R frontal glioblastoma  Primary Rad/Onc:  Dr. Shawn Jolley    Site Delivered Dose (cGy) Prescribed Dose (cGy) Fraction #   RIGHT FRONTAL 2200 4600 11/23   R FRONTAL BOOST 0 1400 0/7     First treatment date:   6/30/22  Concurrent chemotherapy:  CONC. [facility]    Oncology Vitals 6/21/2022 7/7/2022 7/15/2022   Height 5' 1\" - -   Height 155 cm - -   Weight 105 lb - -   Weight 47.628 kg - -   BSA (m2) 1.44 m2 - -   /88 116/79 119/78   Pulse 90 80 93   Resp 18 16 16   Temp 99.6 99.2 99.2   SpO2 - 98 98   Pain Score 0 0 0   Some recent data might be hidden        Toxicities:  Fatigue Grade 1= Fatigue relieved by rest  Alopecia absent  Dry Skin absent  Nausea Grade 0= None  Vomiting Grade 0= None  Muscle weakness Grade 0= None  Dizziness Grade 0= None  Blurred vision Grade 0= None  Headaches Grade 0= None  Gait disturbance Grade 0= None  Thrush Grade 0= None    Nursing Note:  CBC:    Lab Results   Component Value Date    WBC 9.6 07/07/2022    WBC 10.5 06/30/2022    WBC 7.2 05/05/2022     Lab Results   Component Value Date    HGB 9.5 (L) 07/07/2022    HGB 8.9 (L) 06/30/2022    HGB 12.0 05/05/2022      Lab Results   Component Value Date    .0 07/07/2022    .0 06/30/2022    .0 05/05/2022     Pt feeling better, does PT/OT 2x/week. Has increased strength to LUE and LLE. Cont on Decadron 2mg BID. No HA, no nausea, no visual changes. BM \"back to normal\" per . Cassandra Ayala RN    Physician Note:  Subjective:  -better energy, doing PT at home 2x per week  -no nausea with zofran  -on dex 2mg once daily      Objective:  Vitals noted  KPS 80  NAD  No alopecia  LUE weakness      Treatment setup imaging have been reviewed: Yes    Assessment/Plan:  -Tolerating treatment, cont CRT per plan. -Weekly labs --> pt tired today so will check on Monday.   -reinforced need to take pantoprazole while on dex    We discussed expected future toxicity. All questions answered.   Next OTV 1 week    Sukhdev Bateman MD  For Dr. Teresa Pedro

## 2022-07-18 PROCEDURE — 77386 HC IMRT COMPLEX: CPT | Performed by: RADIOLOGY

## 2022-07-19 DIAGNOSIS — C71.9 GBM (GLIOBLASTOMA MULTIFORME) (HCC): Primary | ICD-10-CM

## 2022-07-19 PROCEDURE — 77386 HC IMRT COMPLEX: CPT | Performed by: RADIOLOGY

## 2022-07-19 RX ORDER — DEXAMETHASONE 2 MG/1
2 TABLET ORAL 2 TIMES DAILY WITH MEALS
Qty: 60 TABLET | Refills: 1 | Status: SHIPPED | OUTPATIENT
Start: 2022-07-19

## 2022-07-19 RX ORDER — LEVETIRACETAM 1000 MG/1
1000 TABLET ORAL 2 TIMES DAILY
Qty: 60 TABLET | Refills: 1 | Status: SHIPPED | OUTPATIENT
Start: 2022-07-19

## 2022-07-20 PROCEDURE — 77386 HC IMRT COMPLEX: CPT | Performed by: RADIOLOGY

## 2022-07-21 ENCOUNTER — APPOINTMENT (OUTPATIENT)
Dept: HEMATOLOGY/ONCOLOGY | Age: 42
End: 2022-07-21
Attending: INTERNAL MEDICINE
Payer: OTHER GOVERNMENT

## 2022-07-21 ENCOUNTER — NURSE ONLY (OUTPATIENT)
Dept: HEMATOLOGY/ONCOLOGY | Facility: HOSPITAL | Age: 42
End: 2022-07-21
Attending: INTERNAL MEDICINE
Payer: OTHER GOVERNMENT

## 2022-07-21 ENCOUNTER — HOSPITAL ENCOUNTER (OUTPATIENT)
Dept: RADIATION ONCOLOGY | Facility: HOSPITAL | Age: 42
Discharge: HOME | End: 2022-07-21
Attending: RADIOLOGY
Payer: OTHER GOVERNMENT

## 2022-07-21 VITALS
OXYGEN SATURATION: 97 % | HEART RATE: 94 BPM | RESPIRATION RATE: 16 BRPM | DIASTOLIC BLOOD PRESSURE: 90 MMHG | TEMPERATURE: 99 F | SYSTOLIC BLOOD PRESSURE: 134 MMHG

## 2022-07-21 DIAGNOSIS — C71.9 GLIOBLASTOMA (HCC): ICD-10-CM

## 2022-07-21 DIAGNOSIS — C71.9 GLIOBLASTOMA (HCC): Primary | ICD-10-CM

## 2022-07-21 LAB
ALBUMIN SERPL-MCNC: 3.4 G/DL (ref 3.4–5)
ALBUMIN/GLOB SERPL: 1 {RATIO} (ref 1–2)
ALP LIVER SERPL-CCNC: 90 U/L
ALT SERPL-CCNC: 37 U/L
ANION GAP SERPL CALC-SCNC: 4 MMOL/L (ref 0–18)
AST SERPL-CCNC: 16 U/L (ref 15–37)
BASOPHILS # BLD AUTO: 0.01 X10(3) UL (ref 0–0.2)
BASOPHILS NFR BLD AUTO: 0.1 %
BILIRUB SERPL-MCNC: 0.3 MG/DL (ref 0.1–2)
BUN BLD-MCNC: 15 MG/DL (ref 7–18)
CALCIUM BLD-MCNC: 9.1 MG/DL (ref 8.5–10.1)
CHLORIDE SERPL-SCNC: 108 MMOL/L (ref 98–112)
CO2 SERPL-SCNC: 30 MMOL/L (ref 21–32)
CREAT BLD-MCNC: 0.42 MG/DL
EOSINOPHIL # BLD AUTO: 0.01 X10(3) UL (ref 0–0.7)
EOSINOPHIL NFR BLD AUTO: 0.1 %
ERYTHROCYTE [DISTWIDTH] IN BLOOD BY AUTOMATED COUNT: 18.7 %
FASTING STATUS PATIENT QL REPORTED: NO
GLOBULIN PLAS-MCNC: 3.5 G/DL (ref 2.8–4.4)
GLUCOSE BLD-MCNC: 98 MG/DL (ref 70–99)
HCT VFR BLD AUTO: 33.4 %
HGB BLD-MCNC: 9.8 G/DL
IMM GRANULOCYTES # BLD AUTO: 0.12 X10(3) UL (ref 0–1)
IMM GRANULOCYTES NFR BLD: 1.7 %
LYMPHOCYTES # BLD AUTO: 1.35 X10(3) UL (ref 1–4)
LYMPHOCYTES NFR BLD AUTO: 19.7 %
MCH RBC QN AUTO: 24.5 PG (ref 26–34)
MCHC RBC AUTO-ENTMCNC: 29.3 G/DL (ref 31–37)
MCV RBC AUTO: 83.5 FL
MONOCYTES # BLD AUTO: 0.35 X10(3) UL (ref 0.1–1)
MONOCYTES NFR BLD AUTO: 5.1 %
NEUTROPHILS # BLD AUTO: 5.02 X10 (3) UL (ref 1.5–7.7)
NEUTROPHILS # BLD AUTO: 5.02 X10(3) UL (ref 1.5–7.7)
NEUTROPHILS NFR BLD AUTO: 73.3 %
OSMOLALITY SERPL CALC.SUM OF ELEC: 295 MOSM/KG (ref 275–295)
PLATELET # BLD AUTO: 355 10(3)UL (ref 150–450)
POTASSIUM SERPL-SCNC: 3.8 MMOL/L (ref 3.5–5.1)
PROT SERPL-MCNC: 6.9 G/DL (ref 6.4–8.2)
RBC # BLD AUTO: 4 X10(6)UL
SODIUM SERPL-SCNC: 142 MMOL/L (ref 136–145)
WBC # BLD AUTO: 6.9 X10(3) UL (ref 4–11)

## 2022-07-21 PROCEDURE — 36415 COLL VENOUS BLD VENIPUNCTURE: CPT

## 2022-07-21 PROCEDURE — 77386 HC IMRT COMPLEX: CPT | Performed by: RADIOLOGY

## 2022-07-21 PROCEDURE — 85025 COMPLETE CBC W/AUTO DIFF WBC: CPT

## 2022-07-21 PROCEDURE — 80053 COMPREHEN METABOLIC PANEL: CPT

## 2022-07-21 NOTE — PROGRESS NOTES
[facility] Radiation Treatment Management Note 16-20    Patient:  Nabila Rivera  Age:  38year old  Visit Diagnosis:    1. Glioblastoma ([address]      Primary Rad/Onc:  Dr. Phillip Casarez    Site Delivered Dose (cGy) Prescribed Dose (cGy) Fraction #   RIGHT FRONTAL  3000 4600 15/23   R FRONTAL BOOST 0 1400 0/7     First treatment date:   6/30/22  Concurrent chemotherapy:  CONC. TEMODAR ([facility])    Oncology Vitals 7/7/2022 7/15/2022 7/21/2022   Height - - -   Height - - -   Weight - - -   Weight - - -   BSA (m2) - - -   /79 119/78 134/90   Pulse 80 93 94   Resp 16 16 16   Temp 99.2 99.2 99.4   SpO2 98 98 97   Pain Score 0 0 0   Some recent data might be hidden        Toxicities:  Fatigue Grade 1= Fatigue relieved by rest  Alopecia noted  Dry Skin absent  Nausea Grade 0= None  Vomiting Grade 0= None  Muscle weakness Grade 0= None  Dizziness Grade 0= None  Blurred vision Grade 0= None  Headaches Grade 0= None  Gait disturbance Grade 0= None  Thrush Grade 0= None    Nursing Note:  CBC:    Lab Results   Component Value Date    WBC 6.9 07/21/2022    WBC 9.6 07/07/2022    WBC 10.5 06/30/2022     Lab Results   Component Value Date    HGB 9.8 (L) 07/21/2022    HGB 9.5 (L) 07/07/2022    HGB 8.9 (L) 06/30/2022      Lab Results   Component Value Date    .0 07/21/2022    .0 07/07/2022    .0 06/30/2022     Tolerating Temodar, to have labs today. Noted increased hair loss, no scalp tenderness. Increased strength to L extremities, able to walk some steps with cane. Appetite doing well. Lindsey Iqbal, RN    Physician Note:  Subjective:  Doing better  Dex at 2 mg  Home PT - setup by PCP      Objective:  Looks better overall      Treatment setup imaging have been reviewed:   Yes    Assessment/Plan:   back to work - will need papers  Cont RT and Tem; will taper steroids after RT; plan MRI about 2 mo post RT      Dr. Phillip Casarez

## 2022-07-22 PROCEDURE — 77336 RADIATION PHYSICS CONSULT: CPT | Performed by: RADIOLOGY

## 2022-07-22 PROCEDURE — 77386 HC IMRT COMPLEX: CPT | Performed by: RADIOLOGY

## 2022-07-25 ENCOUNTER — SOCIAL WORK SERVICES (OUTPATIENT)
Dept: HEMATOLOGY/ONCOLOGY | Facility: HOSPITAL | Age: 42
End: 2022-07-25

## 2022-07-25 ENCOUNTER — OFFICE VISIT (OUTPATIENT)
Dept: HEMATOLOGY/ONCOLOGY | Facility: HOSPITAL | Age: 42
End: 2022-07-25
Attending: INTERNAL MEDICINE
Payer: OTHER GOVERNMENT

## 2022-07-25 VITALS
TEMPERATURE: 99 F | RESPIRATION RATE: 16 BRPM | SYSTOLIC BLOOD PRESSURE: 124 MMHG | HEART RATE: 106 BPM | DIASTOLIC BLOOD PRESSURE: 88 MMHG | OXYGEN SATURATION: 98 %

## 2022-07-25 DIAGNOSIS — C71.9 GBM (GLIOBLASTOMA MULTIFORME) (HCC): Primary | ICD-10-CM

## 2022-07-25 PROCEDURE — 77386 HC IMRT COMPLEX: CPT | Performed by: RADIOLOGY

## 2022-07-25 PROCEDURE — 99215 OFFICE O/P EST HI 40 MIN: CPT | Performed by: INTERNAL MEDICINE

## 2022-07-26 PROCEDURE — 77386 HC IMRT COMPLEX: CPT | Performed by: RADIOLOGY

## 2022-07-27 PROCEDURE — 77386 HC IMRT COMPLEX: CPT | Performed by: RADIOLOGY

## 2022-07-28 ENCOUNTER — APPOINTMENT (OUTPATIENT)
Dept: RADIATION ONCOLOGY | Facility: HOSPITAL | Age: 42
End: 2022-07-28
Attending: RADIOLOGY
Payer: OTHER GOVERNMENT

## 2022-07-28 ENCOUNTER — HOSPITAL ENCOUNTER (OUTPATIENT)
Dept: RADIATION ONCOLOGY | Facility: HOSPITAL | Age: 42
Discharge: HOME | End: 2022-07-28
Attending: RADIOLOGY
Payer: OTHER GOVERNMENT

## 2022-07-28 ENCOUNTER — NURSE ONLY (OUTPATIENT)
Dept: HEMATOLOGY/ONCOLOGY | Facility: HOSPITAL | Age: 42
End: 2022-07-28
Attending: INTERNAL MEDICINE
Payer: OTHER GOVERNMENT

## 2022-07-28 VITALS
SYSTOLIC BLOOD PRESSURE: 123 MMHG | HEART RATE: 97 BPM | RESPIRATION RATE: 16 BRPM | TEMPERATURE: 99 F | DIASTOLIC BLOOD PRESSURE: 87 MMHG

## 2022-07-28 DIAGNOSIS — C71.9 GLIOBLASTOMA (HCC): Primary | ICD-10-CM

## 2022-07-28 DIAGNOSIS — C71.9 GLIOBLASTOMA (HCC): ICD-10-CM

## 2022-07-28 LAB
BASOPHILS # BLD AUTO: 0.01 X10(3) UL (ref 0–0.2)
BASOPHILS NFR BLD AUTO: 0.1 %
EOSINOPHIL # BLD AUTO: 0.01 X10(3) UL (ref 0–0.7)
EOSINOPHIL NFR BLD AUTO: 0.1 %
ERYTHROCYTE [DISTWIDTH] IN BLOOD BY AUTOMATED COUNT: 18.9 %
HCT VFR BLD AUTO: 34 %
HGB BLD-MCNC: 10.1 G/DL
IMM GRANULOCYTES # BLD AUTO: 0.1 X10(3) UL (ref 0–1)
IMM GRANULOCYTES NFR BLD: 1.4 %
LYMPHOCYTES # BLD AUTO: 1.06 X10(3) UL (ref 1–4)
LYMPHOCYTES NFR BLD AUTO: 14.4 %
MCH RBC QN AUTO: 24.6 PG (ref 26–34)
MCHC RBC AUTO-ENTMCNC: 29.7 G/DL (ref 31–37)
MCV RBC AUTO: 82.7 FL
MONOCYTES # BLD AUTO: 0.43 X10(3) UL (ref 0.1–1)
MONOCYTES NFR BLD AUTO: 5.9 %
NEUTROPHILS # BLD AUTO: 5.73 X10 (3) UL (ref 1.5–7.7)
NEUTROPHILS # BLD AUTO: 5.73 X10(3) UL (ref 1.5–7.7)
NEUTROPHILS NFR BLD AUTO: 78.1 %
PLATELET # BLD AUTO: 374 10(3)UL (ref 150–450)
RBC # BLD AUTO: 4.11 X10(6)UL
WBC # BLD AUTO: 7.3 X10(3) UL (ref 4–11)

## 2022-07-28 PROCEDURE — 85025 COMPLETE CBC W/AUTO DIFF WBC: CPT

## 2022-07-28 PROCEDURE — 36415 COLL VENOUS BLD VENIPUNCTURE: CPT

## 2022-08-01 ENCOUNTER — HOSPITAL ENCOUNTER (OUTPATIENT)
Dept: RADIATION ONCOLOGY | Facility: HOSPITAL | Age: 42
Discharge: HOME | End: 2022-08-01
Attending: RADIOLOGY
Payer: OTHER GOVERNMENT

## 2022-08-01 PROCEDURE — 77386 HC IMRT COMPLEX: CPT | Performed by: RADIOLOGY

## 2022-08-02 PROCEDURE — 77386 HC IMRT COMPLEX: CPT | Performed by: RADIOLOGY

## 2022-08-03 ENCOUNTER — HOSPITAL ENCOUNTER (OUTPATIENT)
Dept: RADIATION ONCOLOGY | Facility: HOSPITAL | Age: 42
Discharge: HOME | End: 2022-08-03
Attending: INTERNAL MEDICINE
Payer: OTHER GOVERNMENT

## 2022-08-03 PROCEDURE — 77386 HC IMRT COMPLEX: CPT | Performed by: RADIOLOGY

## 2022-08-04 ENCOUNTER — HOSPITAL ENCOUNTER (OUTPATIENT)
Dept: RADIATION ONCOLOGY | Facility: HOSPITAL | Age: 42
Discharge: HOME | End: 2022-08-04
Attending: RADIOLOGY
Payer: OTHER GOVERNMENT

## 2022-08-04 ENCOUNTER — NURSE ONLY (OUTPATIENT)
Dept: HEMATOLOGY/ONCOLOGY | Facility: HOSPITAL | Age: 42
End: 2022-08-04
Attending: INTERNAL MEDICINE
Payer: OTHER GOVERNMENT

## 2022-08-04 VITALS
TEMPERATURE: 99 F | BODY MASS INDEX: 25 KG/M2 | SYSTOLIC BLOOD PRESSURE: 126 MMHG | DIASTOLIC BLOOD PRESSURE: 88 MMHG | HEART RATE: 87 BPM | OXYGEN SATURATION: 97 % | RESPIRATION RATE: 16 BRPM | WEIGHT: 130.63 LBS

## 2022-08-04 DIAGNOSIS — C71.9 GBM (GLIOBLASTOMA MULTIFORME) (HCC): ICD-10-CM

## 2022-08-04 DIAGNOSIS — C71.9 GLIOBLASTOMA (HCC): Primary | ICD-10-CM

## 2022-08-04 DIAGNOSIS — C71.9 GLIOBLASTOMA (HCC): ICD-10-CM

## 2022-08-04 LAB
BASOPHILS # BLD AUTO: 0.02 X10(3) UL (ref 0–0.2)
BASOPHILS NFR BLD AUTO: 0.2 %
EOSINOPHIL # BLD AUTO: 0 X10(3) UL (ref 0–0.7)
EOSINOPHIL NFR BLD AUTO: 0 %
ERYTHROCYTE [DISTWIDTH] IN BLOOD BY AUTOMATED COUNT: 18.5 %
HCT VFR BLD AUTO: 34.1 %
HGB BLD-MCNC: 10.2 G/DL
IMM GRANULOCYTES # BLD AUTO: 0.18 X10(3) UL (ref 0–1)
IMM GRANULOCYTES NFR BLD: 2.2 %
LYMPHOCYTES # BLD AUTO: 0.89 X10(3) UL (ref 1–4)
LYMPHOCYTES NFR BLD AUTO: 10.9 %
MCH RBC QN AUTO: 24.5 PG (ref 26–34)
MCHC RBC AUTO-ENTMCNC: 29.9 G/DL (ref 31–37)
MCV RBC AUTO: 81.8 FL
MONOCYTES # BLD AUTO: 0.25 X10(3) UL (ref 0.1–1)
MONOCYTES NFR BLD AUTO: 3.1 %
NEUTROPHILS # BLD AUTO: 6.82 X10 (3) UL (ref 1.5–7.7)
NEUTROPHILS # BLD AUTO: 6.82 X10(3) UL (ref 1.5–7.7)
NEUTROPHILS NFR BLD AUTO: 83.6 %
PLATELET # BLD AUTO: 388 10(3)UL (ref 150–450)
RBC # BLD AUTO: 4.17 X10(6)UL
WBC # BLD AUTO: 8.2 X10(3) UL (ref 4–11)

## 2022-08-04 PROCEDURE — 36415 COLL VENOUS BLD VENIPUNCTURE: CPT

## 2022-08-04 PROCEDURE — 77386 HC IMRT COMPLEX: CPT | Performed by: RADIOLOGY

## 2022-08-04 PROCEDURE — 85025 COMPLETE CBC W/AUTO DIFF WBC: CPT

## 2022-08-04 RX ORDER — DEXAMETHASONE 1 MG
TABLET ORAL
Qty: 5 TABLET | Refills: 0 | Status: SHIPPED | OUTPATIENT
Start: 2022-08-04

## 2022-08-04 NOTE — PROGRESS NOTES
[facility] Radiation Treatment Management Note 21-25    Patient:  Anny Mckenzie  Age:  38year old  Visit Diagnosis:    1. Glioblastoma ([address]      Primary Rad/Onc:  Dr. Rose Villareal    Site Delivered Dose (cGy) Prescribed Dose (cGy) Fraction #   RIGHT FRONTAL  4600 4600 23/23   R FRONTAL BOOST 400 1400 2/7     First treatment date:   6/30/22  Concurrent chemotherapy:  CONC. TEMODAR ([facility])    Oncology Vitals 7/25/2022 7/28/2022 8/4/2022   Weight (No Data) - 130 lb 9.6 oz   Weight (No Data) - 59.24 kg   BSA (m2) - - 1.58 m2   /88 123/87 126/88   Pulse 106 97 87   Resp 16 16 16   Temp 99.2 98.8 98.8   SpO2 98 - 97   Pain Score 0 - -   Some recent data might be hidden        Toxicities:  Fatigue Grade 1= Fatigue relieved by rest  Alopecia noted  Dry Skin absent  Nausea Grade 0= None  Vomiting Grade 0= None  Muscle weakness Grade 0= None  Dizziness Grade 0= None  Blurred vision Grade 0= None  Headaches Grade 0= None  Gait disturbance Grade 0= None  Thrush Grade 0= None    Nursing Note:  CBC:    Lab Results   Component Value Date    WBC 7.3 07/28/2022    WBC 6.9 07/21/2022    WBC 9.6 07/07/2022     Lab Results   Component Value Date    HGB 10.1 (L) 07/28/2022    HGB 9.8 (L) 07/21/2022    HGB 9.5 (L) 07/07/2022      Lab Results   Component Value Date    .0 07/28/2022    .0 07/21/2022    .0 07/07/2022     Pt to finish RT next week, AVS to give. Tolerating Temodar. Appetite good, no nausea. Increased alopecia, no scalp tenderness. Increased strength to L side, cont to do PT/OT 3x/week. On Dex 2 mg daily. Cozette Snellen, RN    Physician Note:  Subjective:  Feels left arm a little heavier over last week  No headaches, nausea        Objective:  Puffy face and mild LE edema from steroids      Treatment setup imaging have been reviewed:   Yes    Assessment/Plan:  Continue RT and Tem  Increase dex to 6 mg/d for Fri, Sat, Sun, then resume 2 mg/d    Dex after RT:  2 mg/d x 5 days, 1 mg/d x 5 days, stop  MRI Oct  F/u after MRI    Dr. Mono Reid

## 2022-08-05 PROCEDURE — 77386 HC IMRT COMPLEX: CPT | Performed by: RADIOLOGY

## 2022-08-05 PROCEDURE — 77336 RADIATION PHYSICS CONSULT: CPT | Performed by: RADIOLOGY

## 2022-08-08 PROCEDURE — 77386 HC IMRT COMPLEX: CPT | Performed by: RADIOLOGY

## 2022-08-09 PROCEDURE — 77386 HC IMRT COMPLEX: CPT | Performed by: RADIOLOGY

## 2022-08-10 DIAGNOSIS — C71.9 GLIOBLASTOMA (HCC): ICD-10-CM

## 2022-08-10 PROCEDURE — 77386 HC IMRT COMPLEX: CPT | Performed by: RADIOLOGY

## 2022-08-10 RX ORDER — TEMOZOLOMIDE 5 MG/1
10 CAPSULE ORAL DAILY
Qty: 84 CAPSULE | Refills: 0 | Status: CANCELLED | OUTPATIENT
Start: 2022-08-10 | End: 2022-09-21

## 2022-08-10 RX ORDER — TEMOZOLOMIDE 100 MG/1
100 CAPSULE ORAL DAILY
Qty: 42 CAPSULE | Refills: 0 | Status: CANCELLED | OUTPATIENT
Start: 2022-08-10 | End: 2022-09-21

## 2022-08-11 ENCOUNTER — NURSE ONLY (OUTPATIENT)
Dept: HEMATOLOGY/ONCOLOGY | Facility: HOSPITAL | Age: 42
End: 2022-08-11
Attending: INTERNAL MEDICINE
Payer: OTHER GOVERNMENT

## 2022-08-11 ENCOUNTER — DOCUMENTATION ONLY (OUTPATIENT)
Dept: RADIATION ONCOLOGY | Facility: HOSPITAL | Age: 42
End: 2022-08-11

## 2022-08-11 ENCOUNTER — HOSPITAL ENCOUNTER (OUTPATIENT)
Dept: RADIATION ONCOLOGY | Facility: HOSPITAL | Age: 42
Discharge: HOME | End: 2022-08-11
Attending: RADIOLOGY
Payer: OTHER GOVERNMENT

## 2022-08-11 VITALS
SYSTOLIC BLOOD PRESSURE: 117 MMHG | TEMPERATURE: 98 F | HEART RATE: 86 BPM | RESPIRATION RATE: 16 BRPM | DIASTOLIC BLOOD PRESSURE: 80 MMHG | OXYGEN SATURATION: 96 %

## 2022-08-11 DIAGNOSIS — C71.9 GLIOBLASTOMA (HCC): ICD-10-CM

## 2022-08-11 DIAGNOSIS — C71.9 GLIOBLASTOMA (HCC): Primary | ICD-10-CM

## 2022-08-11 LAB
BASOPHILS # BLD AUTO: 0.01 X10(3) UL (ref 0–0.2)
BASOPHILS NFR BLD AUTO: 0.1 %
EOSINOPHIL # BLD AUTO: 0.01 X10(3) UL (ref 0–0.7)
EOSINOPHIL NFR BLD AUTO: 0.1 %
ERYTHROCYTE [DISTWIDTH] IN BLOOD BY AUTOMATED COUNT: 18.1 %
HCT VFR BLD AUTO: 33.5 %
HGB BLD-MCNC: 10.1 G/DL
IMM GRANULOCYTES # BLD AUTO: 0.14 X10(3) UL (ref 0–1)
IMM GRANULOCYTES NFR BLD: 1.3 %
LYMPHOCYTES # BLD AUTO: 0.8 X10(3) UL (ref 1–4)
LYMPHOCYTES NFR BLD AUTO: 7.2 %
MCH RBC QN AUTO: 24 PG (ref 26–34)
MCHC RBC AUTO-ENTMCNC: 30.1 G/DL (ref 31–37)
MCV RBC AUTO: 79.6 FL
MONOCYTES # BLD AUTO: 0.29 X10(3) UL (ref 0.1–1)
MONOCYTES NFR BLD AUTO: 2.6 %
NEUTROPHILS # BLD AUTO: 9.88 X10 (3) UL (ref 1.5–7.7)
NEUTROPHILS # BLD AUTO: 9.88 X10(3) UL (ref 1.5–7.7)
NEUTROPHILS NFR BLD AUTO: 88.7 %
PLATELET # BLD AUTO: 386 10(3)UL (ref 150–450)
RBC # BLD AUTO: 4.21 X10(6)UL
WBC # BLD AUTO: 11.1 X10(3) UL (ref 4–11)

## 2022-08-11 PROCEDURE — 77386 HC IMRT COMPLEX: CPT | Performed by: RADIOLOGY

## 2022-08-11 PROCEDURE — 36415 COLL VENOUS BLD VENIPUNCTURE: CPT

## 2022-08-11 PROCEDURE — 85025 COMPLETE CBC W/AUTO DIFF WBC: CPT

## 2022-08-11 RX ORDER — PANTOPRAZOLE SODIUM 40 MG/1
40 TABLET, DELAYED RELEASE ORAL
Qty: 30 TABLET | Refills: 2 | Status: SHIPPED | OUTPATIENT
Start: 2022-08-11

## 2022-08-11 RX ORDER — SULFAMETHOXAZOLE AND TRIMETHOPRIM 800; 160 MG/1; MG/1
TABLET ORAL
COMMUNITY
Start: 2022-08-01

## 2022-08-11 NOTE — PROGRESS NOTES
[facility] Radiation Treatment Management Note 26-30    Patient:  Mak Cousins  Age:  38year old  Visit Diagnosis:    1. Glioblastoma ([address]      Primary Rad/Onc:  Dr. Arthur Bai    Site Delivered Dose (cGy) Prescribed Dose (cGy) Fraction #   RIGHT FRONTAL  4600 4601 23/23   R FRONTAL BOOST 1400 1400 7/7     First treatment date:   6/30/22  Concurrent chemotherapy:  CONC. TEMODAR (WB)    Oncology Vitals 7/28/2022 8/4/2022 8/11/2022   Weight - 130 lb 9.6 oz -   Weight - 59.24 kg -   BSA (m2) - 1.58 m2 -   /87 126/88 117/80   Pulse 97 87 86   Resp 16 16 16   Temp 98.8 98.8 98.4   SpO2 - 97 96   Pain Score - - 0   Some recent data might be hidden        Toxicities:  Fatigue Grade 1= Fatigue relieved by rest  Alopecia noted  Dry Skin absent  Nausea Grade 0= None  Vomiting Grade 0= None  Muscle weakness Grade 0= None  Dizziness Grade 0= None  Blurred vision Grade 0= None  Headaches Grade 0= None  Gait disturbance Grade 0= None  Thrush Grade 0= None    Nursing Note:  CBC:    Lab Results   Component Value Date    WBC 11.1 (H) 08/11/2022    WBC 8.2 08/04/2022    WBC 7.3 07/28/2022     Lab Results   Component Value Date    HGB 10.1 (L) 08/11/2022    HGB 10.2 (L) 08/04/2022    HGB 10.1 (L) 07/28/2022      Lab Results   Component Value Date    .0 08/11/2022    .0 08/04/2022    .0 07/28/2022     Pt done with RT and Temodar today. AVS to give with Decadron wean. Was on 6mg/day, now back to 2mg/day. Pt states numbness to LUE decreased, has more movement to L side. Able to ambulate without assistance. Denies HA, nausea or visual changes. Emy Giron, RN    Physician Note:  Subjective:  -done with RT today, doing well, no neuro symptoms, ambulation much better      Objective:  Vitals noted  KPS 90  CN II-XII grossly intact  Moderate alopecia      Treatment setup imaging have been reviewed: Yes    Assessment/Plan:  -Tolerated treatment well.   -Dex taper provided, will refill PPI.  -Sees Dr. Claudene Penta later this month. We discussed expected future toxicity. All questions answered.   RTC after MRI    Joanna Cheung MD  For Dr. Dewayne Dupree

## 2022-08-12 PROCEDURE — 77336 RADIATION PHYSICS CONSULT: CPT | Performed by: RADIOLOGY

## 2022-08-20 ENCOUNTER — HOSPITAL ENCOUNTER (INPATIENT)
Facility: HOSPITAL | Age: 42
LOS: 2 days | Discharge: HOME | End: 2022-08-22
Attending: EMERGENCY MEDICINE | Admitting: HOSPITALIST
Payer: OTHER GOVERNMENT

## 2022-08-20 ENCOUNTER — APPOINTMENT (OUTPATIENT)
Dept: CT IMAGING | Facility: HOSPITAL | Age: 42
End: 2022-08-20
Attending: EMERGENCY MEDICINE
Payer: OTHER GOVERNMENT

## 2022-08-20 DIAGNOSIS — R41.82 ALTERED MENTAL STATUS, UNSPECIFIED ALTERED MENTAL STATUS TYPE: ICD-10-CM

## 2022-08-20 DIAGNOSIS — R29.898 LEFT LEG WEAKNESS: ICD-10-CM

## 2022-08-20 DIAGNOSIS — G93.6 CEREBRAL EDEMA (HCC): ICD-10-CM

## 2022-08-20 DIAGNOSIS — C71.9 GBM (GLIOBLASTOMA MULTIFORME) (HCC): Primary | ICD-10-CM

## 2022-08-20 LAB
ALBUMIN SERPL-MCNC: 3.5 G/DL (ref 3.4–5)
ALBUMIN/GLOB SERPL: 1.3 {RATIO} (ref 1–2)
ALP LIVER SERPL-CCNC: 62 U/L
ALT SERPL-CCNC: 23 U/L
ANION GAP SERPL CALC-SCNC: 4 MMOL/L (ref 0–18)
AST SERPL-CCNC: 12 U/L (ref 15–37)
ATRIAL RATE: 62 BPM
BASOPHILS # BLD AUTO: 0.01 X10(3) UL (ref 0–0.2)
BASOPHILS NFR BLD AUTO: 0.1 %
BILIRUB SERPL-MCNC: 0.4 MG/DL (ref 0.1–2)
BILIRUB UR QL STRIP.AUTO: NEGATIVE
BUN BLD-MCNC: 11 MG/DL (ref 7–18)
CALCIUM BLD-MCNC: 9.4 MG/DL (ref 8.5–10.1)
CHLORIDE SERPL-SCNC: 108 MMOL/L (ref 98–112)
CLARITY UR REFRACT.AUTO: CLEAR
CO2 SERPL-SCNC: 27 MMOL/L (ref 21–32)
CREAT BLD-MCNC: 0.57 MG/DL
EOSINOPHIL # BLD AUTO: 0.07 X10(3) UL (ref 0–0.7)
EOSINOPHIL NFR BLD AUTO: 1 %
ERYTHROCYTE [DISTWIDTH] IN BLOOD BY AUTOMATED COUNT: 18.4 %
GFR SERPLBLD BASED ON 1.73 SQ M-ARVRAT: 118 ML/MIN/1.73M2 (ref 60–?)
GLOBULIN PLAS-MCNC: 2.8 G/DL (ref 2.8–4.4)
GLUCOSE BLD-MCNC: 109 MG/DL (ref 70–99)
GLUCOSE BLD-MCNC: 118 MG/DL (ref 70–99)
GLUCOSE BLD-MCNC: 135 MG/DL (ref 70–99)
GLUCOSE BLD-MCNC: 189 MG/DL (ref 70–99)
GLUCOSE UR STRIP.AUTO-MCNC: NEGATIVE MG/DL
HCT VFR BLD AUTO: 34.6 %
HGB BLD-MCNC: 10.2 G/DL
IMM GRANULOCYTES # BLD AUTO: 0.07 X10(3) UL (ref 0–1)
IMM GRANULOCYTES NFR BLD: 1 %
KETONES UR STRIP.AUTO-MCNC: NEGATIVE MG/DL
LACTATE SERPL-SCNC: 2.1 MMOL/L (ref 0.4–2)
LACTATE SERPL-SCNC: 2.8 MMOL/L (ref 0.4–2)
LEUKOCYTE ESTERASE UR QL STRIP.AUTO: NEGATIVE
LYMPHOCYTES # BLD AUTO: 1.12 X10(3) UL (ref 1–4)
LYMPHOCYTES NFR BLD AUTO: 16.3 %
MCH RBC QN AUTO: 24 PG (ref 26–34)
MCHC RBC AUTO-ENTMCNC: 29.5 G/DL (ref 31–37)
MCV RBC AUTO: 81.4 FL
MONOCYTES # BLD AUTO: 0.44 X10(3) UL (ref 0.1–1)
MONOCYTES NFR BLD AUTO: 6.4 %
NEUTROPHILS # BLD AUTO: 5.18 X10 (3) UL (ref 1.5–7.7)
NEUTROPHILS # BLD AUTO: 5.18 X10(3) UL (ref 1.5–7.7)
NEUTROPHILS NFR BLD AUTO: 75.2 %
NITRITE UR QL STRIP.AUTO: NEGATIVE
OSMOLALITY SERPL CALC.SUM OF ELEC: 288 MOSM/KG (ref 275–295)
P AXIS: 20 DEGREES
P-R INTERVAL: 156 MS
PH UR STRIP.AUTO: 7 [PH] (ref 5–8)
PLATELET # BLD AUTO: 319 10(3)UL (ref 150–450)
POTASSIUM SERPL-SCNC: 3.3 MMOL/L (ref 3.5–5.1)
PROT SERPL-MCNC: 6.3 G/DL (ref 6.4–8.2)
PROT UR STRIP.AUTO-MCNC: NEGATIVE MG/DL
Q-T INTERVAL: 384 MS
QRS DURATION: 86 MS
QTC CALCULATION (BEZET): 389 MS
R AXIS: 7 DEGREES
RBC # BLD AUTO: 4.25 X10(6)UL
RBC UR QL AUTO: NEGATIVE
SARS-COV-2 RNA RESP QL NAA+PROBE: NOT DETECTED
SODIUM SERPL-SCNC: 139 MMOL/L (ref 136–145)
SP GR UR STRIP.AUTO: 1.01 (ref 1–1.03)
T AXIS: 40 DEGREES
UROBILINOGEN UR STRIP.AUTO-MCNC: <2 MG/DL
VENTRICULAR RATE: 62 BPM
WBC # BLD AUTO: 6.9 X10(3) UL (ref 4–11)

## 2022-08-20 PROCEDURE — 99223 1ST HOSP IP/OBS HIGH 75: CPT | Performed by: HOSPITALIST

## 2022-08-20 PROCEDURE — 70450 CT HEAD/BRAIN W/O DYE: CPT | Performed by: EMERGENCY MEDICINE

## 2022-08-20 RX ORDER — METOCLOPRAMIDE HYDROCHLORIDE 5 MG/ML
10 INJECTION INTRAMUSCULAR; INTRAVENOUS EVERY 8 HOURS PRN
Status: DISCONTINUED | OUTPATIENT
Start: 2022-08-20 | End: 2022-08-22

## 2022-08-20 RX ORDER — SODIUM CHLORIDE 9 MG/ML
INJECTION, SOLUTION INTRAVENOUS CONTINUOUS
Status: DISCONTINUED | OUTPATIENT
Start: 2022-08-20 | End: 2022-08-22

## 2022-08-20 RX ORDER — LABETALOL HYDROCHLORIDE 5 MG/ML
10 INJECTION, SOLUTION INTRAVENOUS EVERY 10 MIN PRN
Status: DISCONTINUED | OUTPATIENT
Start: 2022-08-20 | End: 2022-08-22

## 2022-08-20 RX ORDER — PANTOPRAZOLE SODIUM 40 MG/1
40 TABLET, DELAYED RELEASE ORAL
Status: DISCONTINUED | OUTPATIENT
Start: 2022-08-21 | End: 2022-08-22

## 2022-08-20 RX ORDER — ONDANSETRON 2 MG/ML
4 INJECTION INTRAMUSCULAR; INTRAVENOUS EVERY 6 HOURS PRN
Status: DISCONTINUED | OUTPATIENT
Start: 2022-08-20 | End: 2022-08-22

## 2022-08-20 RX ORDER — BENZONATATE 100 MG/1
200 CAPSULE ORAL 3 TIMES DAILY PRN
Status: DISCONTINUED | OUTPATIENT
Start: 2022-08-20 | End: 2022-08-22

## 2022-08-20 RX ORDER — LEVETIRACETAM 500 MG/1
1000 TABLET ORAL 2 TIMES DAILY
Status: DISCONTINUED | OUTPATIENT
Start: 2022-08-20 | End: 2022-08-22

## 2022-08-20 RX ORDER — MELATONIN
3 NIGHTLY PRN
Status: DISCONTINUED | OUTPATIENT
Start: 2022-08-20 | End: 2022-08-22

## 2022-08-20 RX ORDER — BISACODYL 10 MG
10 SUPPOSITORY, RECTAL RECTAL
Status: DISCONTINUED | OUTPATIENT
Start: 2022-08-20 | End: 2022-08-22

## 2022-08-20 RX ORDER — SENNOSIDES 8.6 MG
17.2 TABLET ORAL NIGHTLY PRN
Status: DISCONTINUED | OUTPATIENT
Start: 2022-08-20 | End: 2022-08-22

## 2022-08-20 RX ORDER — POLYETHYLENE GLYCOL 3350 17 G/17G
17 POWDER, FOR SOLUTION ORAL DAILY PRN
Status: DISCONTINUED | OUTPATIENT
Start: 2022-08-20 | End: 2022-08-22

## 2022-08-20 RX ORDER — MIDAZOLAM HYDROCHLORIDE 1 MG/ML
2 INJECTION INTRAMUSCULAR; INTRAVENOUS
Status: DISCONTINUED | OUTPATIENT
Start: 2022-08-20 | End: 2022-08-22

## 2022-08-20 RX ORDER — DEXAMETHASONE SODIUM PHOSPHATE 10 MG/ML
10 INJECTION, SOLUTION INTRAMUSCULAR; INTRAVENOUS ONCE
Status: COMPLETED | OUTPATIENT
Start: 2022-08-20 | End: 2022-08-20

## 2022-08-20 RX ORDER — ACETAMINOPHEN 325 MG/1
650 TABLET ORAL EVERY 4 HOURS PRN
Status: DISCONTINUED | OUTPATIENT
Start: 2022-08-20 | End: 2022-08-22

## 2022-08-20 RX ORDER — ACETAMINOPHEN 500 MG
500 TABLET ORAL EVERY 4 HOURS PRN
Status: DISCONTINUED | OUTPATIENT
Start: 2022-08-20 | End: 2022-08-22

## 2022-08-20 RX ORDER — HYDRALAZINE HYDROCHLORIDE 20 MG/ML
10 INJECTION INTRAMUSCULAR; INTRAVENOUS EVERY 2 HOUR PRN
Status: DISCONTINUED | OUTPATIENT
Start: 2022-08-20 | End: 2022-08-22

## 2022-08-20 RX ORDER — ACETAMINOPHEN 650 MG/1
650 SUPPOSITORY RECTAL EVERY 4 HOURS PRN
Status: DISCONTINUED | OUTPATIENT
Start: 2022-08-20 | End: 2022-08-22

## 2022-08-20 RX ORDER — ECHINACEA PURPUREA EXTRACT 125 MG
1 TABLET ORAL
Status: DISCONTINUED | OUTPATIENT
Start: 2022-08-20 | End: 2022-08-22

## 2022-08-20 RX ORDER — DEXAMETHASONE SODIUM PHOSPHATE 4 MG/ML
4 VIAL (ML) INJECTION EVERY 6 HOURS
Status: DISCONTINUED | OUTPATIENT
Start: 2022-08-20 | End: 2022-08-22

## 2022-08-20 RX ORDER — ONDANSETRON 2 MG/ML
8 INJECTION INTRAMUSCULAR; INTRAVENOUS EVERY 6 HOURS PRN
Status: DISCONTINUED | OUTPATIENT
Start: 2022-08-20 | End: 2022-08-22

## 2022-08-20 NOTE — ED INITIAL ASSESSMENT (HPI)
Pt call EMS for headache post brain caner tumor removal 4mos ago; altered mental status increasing since EMS arrived; Left sided numbness and tingling baseline; Pt has nausea and not yet vomited.  arriving separately and has complete hx; EMS states spouse is poor historian.

## 2022-08-20 NOTE — PLAN OF CARE
Received from ER, drowsy but waking up for neuro assessment, see flow sheet for full assessment. Left sided weakness noted which is baseline. Decadron given as ordered. Up to bathroom and chair, with aid of walker due to unsteady gait.  briefly at bedside at arrival but having to leave to see children at home. Plan of care updated with patient. Will continue to monitor.

## 2022-08-21 LAB
ANION GAP SERPL CALC-SCNC: 6 MMOL/L (ref 0–18)
BUN BLD-MCNC: 13 MG/DL (ref 7–18)
CALCIUM BLD-MCNC: 9.1 MG/DL (ref 8.5–10.1)
CHLORIDE SERPL-SCNC: 111 MMOL/L (ref 98–112)
CO2 SERPL-SCNC: 24 MMOL/L (ref 21–32)
CREAT BLD-MCNC: 0.52 MG/DL
ERYTHROCYTE [DISTWIDTH] IN BLOOD BY AUTOMATED COUNT: 17.8 %
EST. AVERAGE GLUCOSE BLD GHB EST-MCNC: 123 MG/DL (ref 68–126)
GFR SERPLBLD BASED ON 1.73 SQ M-ARVRAT: 120 ML/MIN/1.73M2 (ref 60–?)
GLUCOSE BLD-MCNC: 139 MG/DL (ref 70–99)
GLUCOSE BLD-MCNC: 150 MG/DL (ref 70–99)
GLUCOSE BLD-MCNC: 151 MG/DL (ref 70–99)
GLUCOSE BLD-MCNC: 156 MG/DL (ref 70–99)
HBA1C MFR BLD: 5.9 % (ref ?–5.7)
HCT VFR BLD AUTO: 34.8 %
HGB BLD-MCNC: 10 G/DL
MCH RBC QN AUTO: 23.8 PG (ref 26–34)
MCHC RBC AUTO-ENTMCNC: 28.7 G/DL (ref 31–37)
MCV RBC AUTO: 82.9 FL
OSMOLALITY SERPL CALC.SUM OF ELEC: 295 MOSM/KG (ref 275–295)
PLATELET # BLD AUTO: 377 10(3)UL (ref 150–450)
POTASSIUM SERPL-SCNC: 3.7 MMOL/L (ref 3.5–5.1)
POTASSIUM SERPL-SCNC: 3.7 MMOL/L (ref 3.5–5.1)
RBC # BLD AUTO: 4.2 X10(6)UL
SODIUM SERPL-SCNC: 141 MMOL/L (ref 136–145)
WBC # BLD AUTO: 11.1 X10(3) UL (ref 4–11)

## 2022-08-21 PROCEDURE — 99221 1ST HOSP IP/OBS SF/LOW 40: CPT | Performed by: NEUROLOGICAL SURGERY

## 2022-08-21 PROCEDURE — 99232 SBSQ HOSP IP/OBS MODERATE 35: CPT | Performed by: HOSPITALIST

## 2022-08-21 PROCEDURE — 99255 IP/OBS CONSLTJ NEW/EST HI 80: CPT | Performed by: INTERNAL MEDICINE

## 2022-08-21 PROCEDURE — 99291 CRITICAL CARE FIRST HOUR: CPT | Performed by: INTERNAL MEDICINE

## 2022-08-21 RX ORDER — ALPRAZOLAM 0.5 MG/1
0.5 TABLET ORAL 3 TIMES DAILY PRN
Status: DISCONTINUED | OUTPATIENT
Start: 2022-08-21 | End: 2022-08-22

## 2022-08-21 RX ORDER — PANTOPRAZOLE SODIUM 40 MG/1
40 TABLET, DELAYED RELEASE ORAL
Status: DISCONTINUED | OUTPATIENT
Start: 2022-08-21 | End: 2022-08-21

## 2022-08-21 RX ORDER — POTASSIUM CHLORIDE 20 MEQ/1
40 TABLET, EXTENDED RELEASE ORAL ONCE
Status: COMPLETED | OUTPATIENT
Start: 2022-08-21 | End: 2022-08-21

## 2022-08-21 RX ORDER — TEMOZOLOMIDE 100 MG/1
100 CAPSULE ORAL DAILY
Status: DISCONTINUED | OUTPATIENT
Start: 2022-08-21 | End: 2022-08-21

## 2022-08-21 RX ORDER — TEMOZOLOMIDE 5 MG/1
10 CAPSULE ORAL DAILY
Status: DISCONTINUED | OUTPATIENT
Start: 2022-08-21 | End: 2022-08-21

## 2022-08-21 RX ORDER — ENOXAPARIN SODIUM 100 MG/ML
40 INJECTION SUBCUTANEOUS DAILY
Status: DISCONTINUED | OUTPATIENT
Start: 2022-08-21 | End: 2022-08-22

## 2022-08-21 NOTE — PLAN OF CARE
Assumed care at 1300. A&Ox4, alert, no acute neuro changes. LUE drift and decreased sensation. NSR on tele. RA, VSS. SBP goals maintained. Ambulating w/ walker to bathroom.  updated on POC.

## 2022-08-21 NOTE — CM/SW NOTE
[facility]. Unit SW/CM available for dc planning, awaiting recommendations. PT recommending HHC at dc. SW started referrals and will follow up with pt/family tomorrow to discuss recs.     Elias Estrada, ECTOR  Discharge 2011 [facility]

## 2022-08-21 NOTE — PLAN OF CARE
Assumed care of patient at 1. Patient is drowsy, but easy to arouse. Baseline left side weakness, left pronator drift, and decreased sensation on left side noted. Neuro exams q2hrs, see neuro flowsheet for detailed assessment. Denies headache and nausea. Up to chair and bathroom with aid of  walker due to unsteady gait. POC discussed with patient. For complete assessment see E charting.

## 2022-08-21 NOTE — PLAN OF CARE
Assumed care of pt @ 0730. A/Ox 4. Neuros changed to q4. L side weakness. L drift, decreased sensation to L side. Rm air. NSR per tele. SBP goal 100-150. Voids. Ambulating w/ walker to bathroom/halls. Speech and Pt eval done.  (Blenda Hamman) called to complete med list/updated on POC. Transferring to CTU 7. Report given to Olya PETTY). Pt belongings packed and taken with  Pt. Awaiting transport.

## 2022-08-22 ENCOUNTER — APPOINTMENT (OUTPATIENT)
Dept: HEMATOLOGY/ONCOLOGY | Facility: HOSPITAL | Age: 42
End: 2022-08-22
Attending: INTERNAL MEDICINE
Payer: OTHER GOVERNMENT

## 2022-08-22 ENCOUNTER — PATIENT OUTREACH (OUTPATIENT)
Dept: CASE MANAGEMENT | Age: 42
End: 2022-08-22

## 2022-08-22 VITALS
DIASTOLIC BLOOD PRESSURE: 78 MMHG | SYSTOLIC BLOOD PRESSURE: 129 MMHG | OXYGEN SATURATION: 97 % | WEIGHT: 130.31 LBS | HEIGHT: 61 IN | RESPIRATION RATE: 16 BRPM | TEMPERATURE: 98 F | BODY MASS INDEX: 24.6 KG/M2 | HEART RATE: 62 BPM

## 2022-08-22 LAB
GLUCOSE BLD-MCNC: 117 MG/DL (ref 70–99)
GLUCOSE BLD-MCNC: 128 MG/DL (ref 70–99)
POTASSIUM SERPL-SCNC: 3.8 MMOL/L (ref 3.5–5.1)

## 2022-08-22 PROCEDURE — 99231 SBSQ HOSP IP/OBS SF/LOW 25: CPT | Performed by: NEUROLOGICAL SURGERY

## 2022-08-22 PROCEDURE — 99239 HOSP IP/OBS DSCHRG MGMT >30: CPT | Performed by: HOSPITALIST

## 2022-08-22 RX ORDER — DEXAMETHASONE 4 MG/1
4 TABLET ORAL 3 TIMES DAILY
Qty: 30 TABLET | Refills: 0 | Status: SHIPPED | OUTPATIENT
Start: 2022-08-22 | End: 2022-09-02

## 2022-08-22 NOTE — CM/SW NOTE
SW unable to find [facility] in pt insurance. SW spoke with pt,  line used. Pt agreeable to outpt therapy. RN/PT notified.      ECTOR Beth  Discharge 2011 [facility]

## 2022-08-22 NOTE — PROGRESS NOTES
1st attempt TCC apt request    [facility]  [address] [facility] [address] [facility] [phone]  Apt made for Fri.  Aug. 26th @10am    Patients  Emely Loss notified

## 2022-08-22 NOTE — PLAN OF CARE
Assumed care at 0730. Alert and orientated X4. Neuro's Q4, no acute changes. See flowsheets. NSR on tele, RA, VSS. Patient denies pain. Up to the chair for meals, tolerating diet. Patient/family updated on plan of care. Bed/chair alarm on, call light within reach. Monitoring patient needs. Medically cleared for discharge. This RN went over discharge teachings w/ patient/family. Patient/family verbalizing understanding of discharge education. IV removed. Tele removed. Patient discharged home via wheelchair.

## 2022-08-22 NOTE — SLP NOTE
SPEECH DAILY NOTE - INPATIENT    ASSESSMENT & PLAN   ASSESSMENT  Pt seen for dysphagia tx to assess tolerance with recommended diet, ensure proper utilization of aspiration precautions and provide pt/family education. Patient had been place on soft and bite size solids diet on 8/21 as patient preferred bite size diet. RN asked this therapist to see patient regarding possible upgrade as patient is not satisfied with soft and bite sized solids. Patient demonstrates tolerance of soft and bite sized and thin liquids free of over clinical s/s of aspiration. Patient was further assessed with regular solids with patient self presenting solids. She demonstrates a strong bite, functional oral clearing in a timely manner. Patient reports left sided weakness and numbness therefore spontaneously uses a liquid wash. Patient demonstrates a safe and efficient swallow with regular solids and thin liquids. No overt clinical s/s of aspiration. Recommend upgrade of diet to regular solids and continue thin liquids with straw or cup drinking. Will follow up to ensure safety with diet and educate pt on compensatory strategies/swallow precautions. Video swallow study to be completed if CXR declines, increase in clinical signs of aspiration, and/or MD desires. Diet Recommendations - Solids: Regular  Diet Recommendations - Liquids: Thin Liquids    Compensatory Strategies Recommended: Alternate consistencies  Aspiration Precautions: Upright position; Slow rate;Small bites and sips  Medication Administration Recommendations: No restrictions                     Discharge Recommendations  Discharge Recommendations/Plan: Home    Treatment Plan  Treatment Plan/Recommendations: Dysphagia therapy    Interdisciplinary Communication: Discussed with RN  Recommendations posted at bedside        GOALS  Goal #1 The patient will tolerate NDD3 soft, bite-sized consistency and thin liquids without overt signs or symptoms of aspiration with 100 % accuracy over 3 session(s). Met   Goal #2 The patient/family/caregiver will demonstrate understanding and implementation of aspiration precautions and swallow strategies independently over 3 session(s). In Progress   Goal #3 The patient will tolerate trial upgrade of regular consistency and thin liquids without overt signs or symptoms of aspiration with 100 % accuracy over 3 session(s).   In Progress         FOLLOW UP  Follow Up Needed (Documentation Required): Yes  SLP Follow-up Date: 08/23/22  Number of Visits to Meet Established Goals: 1    Session: 1 of 2    If you have any questions, please contact Lori Kruger, SLP

## 2022-08-23 ENCOUNTER — SOCIAL WORK SERVICES (OUTPATIENT)
Dept: HEMATOLOGY/ONCOLOGY | Facility: HOSPITAL | Age: 42
End: 2022-08-23

## 2022-08-23 ENCOUNTER — OFFICE VISIT (OUTPATIENT)
Dept: HEMATOLOGY/ONCOLOGY | Facility: HOSPITAL | Age: 42
End: 2022-08-23
Attending: INTERNAL MEDICINE
Payer: OTHER GOVERNMENT

## 2022-08-23 ENCOUNTER — NURSE ONLY (OUTPATIENT)
Dept: RADIATION ONCOLOGY | Facility: HOSPITAL | Age: 42
End: 2022-08-23

## 2022-08-23 ENCOUNTER — PATIENT OUTREACH (OUTPATIENT)
Dept: CASE MANAGEMENT | Age: 42
End: 2022-08-23

## 2022-08-23 VITALS
SYSTOLIC BLOOD PRESSURE: 134 MMHG | DIASTOLIC BLOOD PRESSURE: 91 MMHG | WEIGHT: 133.63 LBS | TEMPERATURE: 99 F | HEIGHT: 61 IN | BODY MASS INDEX: 25.23 KG/M2 | HEART RATE: 75 BPM | RESPIRATION RATE: 18 BRPM

## 2022-08-23 DIAGNOSIS — Z02.9 ENCOUNTERS FOR ADMINISTRATIVE PURPOSE: ICD-10-CM

## 2022-08-23 DIAGNOSIS — C71.9 GBM (GLIOBLASTOMA MULTIFORME) (HCC): Primary | ICD-10-CM

## 2022-08-23 PROCEDURE — 99215 OFFICE O/P EST HI 40 MIN: CPT | Performed by: INTERNAL MEDICINE

## 2022-08-23 PROCEDURE — 3008F BODY MASS INDEX DOCD: CPT | Performed by: INTERNAL MEDICINE

## 2022-08-23 PROCEDURE — 3080F DIAST BP >= 90 MM HG: CPT | Performed by: INTERNAL MEDICINE

## 2022-08-23 PROCEDURE — 3075F SYST BP GE 130 - 139MM HG: CPT | Performed by: INTERNAL MEDICINE

## 2022-08-23 RX ORDER — TEMOZOLOMIDE 140 MG/1
140 CAPSULE ORAL DAILY
Qty: 5 CAPSULE | Refills: 0 | Status: SHIPPED | OUTPATIENT
Start: 2022-08-23

## 2022-08-23 RX ORDER — TEMOZOLOMIDE 100 MG/1
100 CAPSULE ORAL DAILY
Qty: 5 CAPSULE | Refills: 0 | Status: SHIPPED | OUTPATIENT
Start: 2022-08-23

## 2022-08-23 NOTE — PROGRESS NOTES
Outpatient Oncology Care Plan  Problem list:  knowledge deficit    Problems related to:    disease/disease progression    Interventions:  provided general teaching    Expected outcomes:  understands plan of care    Progress towards outcome:  making progress    Education Record    Learner:  Patient and Spouse  Barriers / Limitations:  None  Method:  Brief focused  Outcome:  Shows understanding  Comments:    Pt here for post hospital follow up in neuro clinic.

## 2022-08-23 NOTE — PROGRESS NOTES
SW assisted with pt's parking placard. SW provided parking placard form to pt's . ANNABEL OrtizW   at [facility] [address], [address] [facility], [place], [address]  [facility] [address] [place] [address], [facility] [address]  Ph: [phone]. [email or web address]  Fax: [phone]

## 2022-08-23 NOTE — PROGRESS NOTES
YESYGENESIS for post hospital follow up. [facility] contact information provided as well as [facility] office number, [phone].

## 2022-08-23 NOTE — PATIENT INSTRUCTIONS
Dexamethasone 4mg 3x/day for 7 days, then decrease to twice daily until you see Dr. Kimber Tapia in 2 weeks. Please await call from the pharmacy concerning your new Temodar prescription. Bring with you when you follow up with Dr. Kimber Tapia in 2 weeks.

## 2022-08-24 NOTE — PROGRESS NOTES
[facility]. [facility] contact information provided as well as [facility] office number, [phone].

## 2022-08-26 ENCOUNTER — OFFICE VISIT (OUTPATIENT)
Dept: INTERNAL MEDICINE CLINIC | Facility: CLINIC | Age: 42
End: 2022-08-26
Payer: OTHER GOVERNMENT

## 2022-08-26 VITALS
HEIGHT: 61 IN | WEIGHT: 135 LBS | OXYGEN SATURATION: 98 % | HEART RATE: 80 BPM | BODY MASS INDEX: 25.49 KG/M2 | DIASTOLIC BLOOD PRESSURE: 83 MMHG | RESPIRATION RATE: 18 BRPM | SYSTOLIC BLOOD PRESSURE: 129 MMHG | TEMPERATURE: 97 F

## 2022-08-26 DIAGNOSIS — R20.0 LEFT SIDED NUMBNESS: ICD-10-CM

## 2022-08-26 DIAGNOSIS — G93.6 VASOGENIC EDEMA (HCC): ICD-10-CM

## 2022-08-26 DIAGNOSIS — C71.9 GBM (GLIOBLASTOMA MULTIFORME) (HCC): Primary | ICD-10-CM

## 2022-08-26 DIAGNOSIS — R29.898 LEFT LEG WEAKNESS: ICD-10-CM

## 2022-08-26 DIAGNOSIS — R90.89 MIDLINE SHIFT OF BRAIN: ICD-10-CM

## 2022-08-26 NOTE — PROGRESS NOTES
Several attempts made to reach the patient with no return call. Patient completed HFU on 8/26/2022. Closing encounter.

## 2022-08-29 ENCOUNTER — ORDER TRANSCRIPTION (OUTPATIENT)
Dept: PHYSICAL THERAPY | Facility: HOSPITAL | Age: 42
End: 2022-08-29

## 2022-08-29 ENCOUNTER — SOCIAL WORK SERVICES (OUTPATIENT)
Dept: HEMATOLOGY/ONCOLOGY | Facility: HOSPITAL | Age: 42
End: 2022-08-29

## 2022-08-29 DIAGNOSIS — R29.898 LEFT LEG WEAKNESS: Primary | ICD-10-CM

## 2022-08-29 NOTE — PROGRESS NOTES
spoke with  and provided information and education for [employer];  reports that he attempted SSA but patient does not qualify due to short work history. Educated on SSI and answered all questions.  thankful and is aware to reach out with any needs.

## 2022-09-01 ENCOUNTER — TELEPHONE (OUTPATIENT)
Dept: HEMATOLOGY/ONCOLOGY | Facility: HOSPITAL | Age: 42
End: 2022-09-01

## 2022-09-01 NOTE — TELEPHONE ENCOUNTER
Pt's  called and states patient is so tired that she can't walk. She reduced the steriods to twice a day on Tuesday. 4 mg BID. Dr. Cole Bender informed. Pt to go back to taking 4 mg TID. Pt's  informed.

## 2022-09-02 RX ORDER — DEXAMETHASONE 4 MG/1
4 TABLET ORAL 3 TIMES DAILY
Qty: 30 TABLET | Refills: 0 | Status: ON HOLD | OUTPATIENT
Start: 2022-09-02

## 2022-09-04 ENCOUNTER — HOSPITAL ENCOUNTER (INPATIENT)
Facility: HOSPITAL | Age: 42
LOS: 2 days | Discharge: HOME HEALTH | DRG: 054 | End: 2022-09-08
Attending: STUDENT IN AN ORGANIZED HEALTH CARE EDUCATION/TRAINING PROGRAM | Admitting: HOSPITALIST
Payer: OTHER GOVERNMENT

## 2022-09-04 ENCOUNTER — APPOINTMENT (OUTPATIENT)
Dept: CT IMAGING | Facility: HOSPITAL | Age: 42
End: 2022-09-04
Attending: STUDENT IN AN ORGANIZED HEALTH CARE EDUCATION/TRAINING PROGRAM
Payer: OTHER GOVERNMENT

## 2022-09-04 ENCOUNTER — HOSPITAL ENCOUNTER (INPATIENT)
Facility: HOSPITAL | Age: 42
LOS: 2 days | Discharge: HOME HEALTH | End: 2022-09-08
Attending: STUDENT IN AN ORGANIZED HEALTH CARE EDUCATION/TRAINING PROGRAM | Admitting: HOSPITALIST
Payer: OTHER GOVERNMENT

## 2022-09-04 ENCOUNTER — APPOINTMENT (OUTPATIENT)
Dept: CT IMAGING | Facility: HOSPITAL | Age: 42
DRG: 054 | End: 2022-09-04
Attending: STUDENT IN AN ORGANIZED HEALTH CARE EDUCATION/TRAINING PROGRAM
Payer: OTHER GOVERNMENT

## 2022-09-04 DIAGNOSIS — C71.9 GBM (GLIOBLASTOMA MULTIFORME) (HCC): Primary | ICD-10-CM

## 2022-09-04 DIAGNOSIS — R56.9 SEIZURE (HCC): ICD-10-CM

## 2022-09-04 DIAGNOSIS — B02.9 HERPES ZOSTER WITHOUT COMPLICATION: ICD-10-CM

## 2022-09-04 LAB
ALBUMIN SERPL-MCNC: 3 G/DL (ref 3.4–5)
ALBUMIN SERPL-MCNC: 3 G/DL (ref 3.4–5)
ALBUMIN/GLOB SERPL: 0.9 {RATIO} (ref 1–2)
ALBUMIN/GLOB SERPL: 0.9 {RATIO} (ref 1–2)
ALP LIVER SERPL-CCNC: 61 U/L
ALP LIVER SERPL-CCNC: 61 U/L
ALT SERPL-CCNC: 63 U/L
ALT SERPL-CCNC: 63 U/L
ANION GAP SERPL CALC-SCNC: 9 MMOL/L (ref 0–18)
ANION GAP SERPL CALC-SCNC: 9 MMOL/L (ref 0–18)
AST SERPL-CCNC: 16 U/L (ref 15–37)
AST SERPL-CCNC: 16 U/L (ref 15–37)
BASOPHILS # BLD: 0 X10(3) UL (ref 0–0.2)
BASOPHILS # BLD: 0 X10(3) UL (ref 0–0.2)
BASOPHILS NFR BLD: 0 %
BASOPHILS NFR BLD: 0 %
BILIRUB SERPL-MCNC: 0.3 MG/DL (ref 0.1–2)
BILIRUB SERPL-MCNC: 0.3 MG/DL (ref 0.1–2)
BUN BLD-MCNC: 32 MG/DL (ref 7–18)
BUN BLD-MCNC: 32 MG/DL (ref 7–18)
CALCIUM BLD-MCNC: 8.4 MG/DL (ref 8.5–10.1)
CALCIUM BLD-MCNC: 8.4 MG/DL (ref 8.5–10.1)
CHLORIDE SERPL-SCNC: 102 MMOL/L (ref 98–112)
CHLORIDE SERPL-SCNC: 102 MMOL/L (ref 98–112)
CO2 SERPL-SCNC: 26 MMOL/L (ref 21–32)
CO2 SERPL-SCNC: 26 MMOL/L (ref 21–32)
CREAT BLD-MCNC: 0.6 MG/DL
CREAT BLD-MCNC: 0.6 MG/DL
EOSINOPHIL # BLD: 0 X10(3) UL (ref 0–0.7)
EOSINOPHIL # BLD: 0 X10(3) UL (ref 0–0.7)
EOSINOPHIL NFR BLD: 0 %
EOSINOPHIL NFR BLD: 0 %
ERYTHROCYTE [DISTWIDTH] IN BLOOD BY AUTOMATED COUNT: 17.7 %
ERYTHROCYTE [DISTWIDTH] IN BLOOD BY AUTOMATED COUNT: 17.7 %
GFR SERPLBLD BASED ON 1.73 SQ M-ARVRAT: 116 ML/MIN/1.73M2 (ref 60–?)
GFR SERPLBLD BASED ON 1.73 SQ M-ARVRAT: 116 ML/MIN/1.73M2 (ref 60–?)
GLOBULIN PLAS-MCNC: 3.4 G/DL (ref 2.8–4.4)
GLOBULIN PLAS-MCNC: 3.4 G/DL (ref 2.8–4.4)
GLUCOSE BLD-MCNC: 170 MG/DL (ref 70–99)
GLUCOSE BLD-MCNC: 170 MG/DL (ref 70–99)
GLUCOSE BLD-MCNC: 182 MG/DL (ref 70–99)
GLUCOSE BLD-MCNC: 182 MG/DL (ref 70–99)
HCT VFR BLD AUTO: 35 %
HCT VFR BLD AUTO: 35 %
HGB BLD-MCNC: 10.6 G/DL
HGB BLD-MCNC: 10.6 G/DL
LYMPHOCYTES NFR BLD: 0.62 X10(3) UL (ref 1–4)
LYMPHOCYTES NFR BLD: 0.62 X10(3) UL (ref 1–4)
LYMPHOCYTES NFR BLD: 6 %
LYMPHOCYTES NFR BLD: 6 %
MCH RBC QN AUTO: 23.9 PG (ref 26–34)
MCH RBC QN AUTO: 23.9 PG (ref 26–34)
MCHC RBC AUTO-ENTMCNC: 30.3 G/DL (ref 31–37)
MCHC RBC AUTO-ENTMCNC: 30.3 G/DL (ref 31–37)
MCV RBC AUTO: 79 FL
MCV RBC AUTO: 79 FL
MONOCYTES # BLD: 0.62 X10(3) UL (ref 0.1–1)
MONOCYTES # BLD: 0.62 X10(3) UL (ref 0.1–1)
MONOCYTES NFR BLD: 6 %
MONOCYTES NFR BLD: 6 %
MORPHOLOGY: NORMAL
MORPHOLOGY: NORMAL
NEUTROPHILS # BLD AUTO: 8.52 X10 (3) UL (ref 1.5–7.7)
NEUTROPHILS # BLD AUTO: 8.52 X10 (3) UL (ref 1.5–7.7)
NEUTROPHILS NFR BLD: 86 %
NEUTROPHILS NFR BLD: 86 %
NEUTS BAND NFR BLD: 2 %
NEUTS BAND NFR BLD: 2 %
NEUTS HYPERSEG # BLD: 9.06 X10(3) UL (ref 1.5–7.7)
NEUTS HYPERSEG # BLD: 9.06 X10(3) UL (ref 1.5–7.7)
OSMOLALITY SERPL CALC.SUM OF ELEC: 295 MOSM/KG (ref 275–295)
OSMOLALITY SERPL CALC.SUM OF ELEC: 295 MOSM/KG (ref 275–295)
PLATELET # BLD AUTO: 264 10(3)UL (ref 150–450)
PLATELET # BLD AUTO: 264 10(3)UL (ref 150–450)
PLATELET MORPHOLOGY: NORMAL
PLATELET MORPHOLOGY: NORMAL
POTASSIUM SERPL-SCNC: 3.5 MMOL/L (ref 3.5–5.1)
POTASSIUM SERPL-SCNC: 3.5 MMOL/L (ref 3.5–5.1)
PROT SERPL-MCNC: 6.4 G/DL (ref 6.4–8.2)
PROT SERPL-MCNC: 6.4 G/DL (ref 6.4–8.2)
RBC # BLD AUTO: 4.43 X10(6)UL
RBC # BLD AUTO: 4.43 X10(6)UL
SARS-COV-2 RNA RESP QL NAA+PROBE: NOT DETECTED
SARS-COV-2 RNA RESP QL NAA+PROBE: NOT DETECTED
SODIUM SERPL-SCNC: 137 MMOL/L (ref 136–145)
SODIUM SERPL-SCNC: 137 MMOL/L (ref 136–145)
TOTAL CELLS COUNTED BLD: 100
TOTAL CELLS COUNTED BLD: 100
WBC # BLD AUTO: 10.3 X10(3) UL (ref 4–11)
WBC # BLD AUTO: 10.3 X10(3) UL (ref 4–11)

## 2022-09-04 PROCEDURE — 70450 CT HEAD/BRAIN W/O DYE: CPT | Performed by: STUDENT IN AN ORGANIZED HEALTH CARE EDUCATION/TRAINING PROGRAM

## 2022-09-04 PROCEDURE — 99222 1ST HOSP IP/OBS MODERATE 55: CPT | Performed by: STUDENT IN AN ORGANIZED HEALTH CARE EDUCATION/TRAINING PROGRAM

## 2022-09-04 RX ORDER — PANTOPRAZOLE SODIUM 40 MG/1
40 TABLET, DELAYED RELEASE ORAL
Status: DISCONTINUED | OUTPATIENT
Start: 2022-09-05 | End: 2022-09-08

## 2022-09-04 RX ORDER — ONDANSETRON 2 MG/ML
4 INJECTION INTRAMUSCULAR; INTRAVENOUS EVERY 6 HOURS PRN
Status: DISCONTINUED | OUTPATIENT
Start: 2022-09-04 | End: 2022-09-08

## 2022-09-04 RX ORDER — MORPHINE SULFATE 4 MG/ML
4 INJECTION, SOLUTION INTRAMUSCULAR; INTRAVENOUS ONCE
Status: COMPLETED | OUTPATIENT
Start: 2022-09-04 | End: 2022-09-04

## 2022-09-04 RX ORDER — DEXAMETHASONE 4 MG/1
4 TABLET ORAL EVERY 12 HOURS SCHEDULED
Status: DISCONTINUED | OUTPATIENT
Start: 2022-09-04 | End: 2022-09-08

## 2022-09-04 RX ORDER — ONDANSETRON 2 MG/ML
4 INJECTION INTRAMUSCULAR; INTRAVENOUS EVERY 4 HOURS PRN
Status: DISCONTINUED | OUTPATIENT
Start: 2022-09-04 | End: 2022-09-04

## 2022-09-04 RX ORDER — ACETAMINOPHEN 500 MG
500 TABLET ORAL EVERY 4 HOURS PRN
Status: DISCONTINUED | OUTPATIENT
Start: 2022-09-04 | End: 2022-09-08

## 2022-09-04 RX ORDER — BISACODYL 10 MG
10 SUPPOSITORY, RECTAL RECTAL
Status: DISCONTINUED | OUTPATIENT
Start: 2022-09-04 | End: 2022-09-08

## 2022-09-04 RX ORDER — SODIUM PHOSPHATE, DIBASIC AND SODIUM PHOSPHATE, MONOBASIC 7; 19 G/133ML; G/133ML
1 ENEMA RECTAL ONCE AS NEEDED
Status: DISCONTINUED | OUTPATIENT
Start: 2022-09-04 | End: 2022-09-08

## 2022-09-04 RX ORDER — PROCHLORPERAZINE EDISYLATE 5 MG/ML
5 INJECTION INTRAMUSCULAR; INTRAVENOUS EVERY 8 HOURS PRN
Status: DISCONTINUED | OUTPATIENT
Start: 2022-09-04 | End: 2022-09-08

## 2022-09-04 RX ORDER — ECHINACEA PURPUREA EXTRACT 125 MG
1 TABLET ORAL
Status: DISCONTINUED | OUTPATIENT
Start: 2022-09-04 | End: 2022-09-08

## 2022-09-04 RX ORDER — SENNOSIDES 8.6 MG
17.2 TABLET ORAL NIGHTLY PRN
Status: DISCONTINUED | OUTPATIENT
Start: 2022-09-04 | End: 2022-09-08

## 2022-09-04 RX ORDER — SULFAMETHOXAZOLE AND TRIMETHOPRIM 800; 160 MG/1; MG/1
1 TABLET ORAL
Status: DISCONTINUED | OUTPATIENT
Start: 2022-09-05 | End: 2022-09-08

## 2022-09-04 RX ORDER — MORPHINE SULFATE 10 MG/ML
10 INJECTION, SOLUTION INTRAMUSCULAR; INTRAVENOUS ONCE
Status: DISCONTINUED | OUTPATIENT
Start: 2022-09-04 | End: 2022-09-04

## 2022-09-04 RX ORDER — MORPHINE SULFATE 4 MG/ML
4 INJECTION, SOLUTION INTRAMUSCULAR; INTRAVENOUS EVERY 30 MIN PRN
Status: ACTIVE | OUTPATIENT
Start: 2022-09-04 | End: 2022-09-05

## 2022-09-04 RX ORDER — POLYETHYLENE GLYCOL 3350 17 G/17G
17 POWDER, FOR SOLUTION ORAL DAILY PRN
Status: DISCONTINUED | OUTPATIENT
Start: 2022-09-04 | End: 2022-09-08

## 2022-09-04 RX ORDER — ONDANSETRON 2 MG/ML
4 INJECTION INTRAMUSCULAR; INTRAVENOUS ONCE
Status: COMPLETED | OUTPATIENT
Start: 2022-09-04 | End: 2022-09-04

## 2022-09-04 RX ORDER — LEVETIRACETAM 500 MG/1
1000 TABLET ORAL 2 TIMES DAILY
Status: DISCONTINUED | OUTPATIENT
Start: 2022-09-04 | End: 2022-09-05

## 2022-09-05 ENCOUNTER — PATIENT MESSAGE (OUTPATIENT)
Dept: HEMATOLOGY/ONCOLOGY | Facility: HOSPITAL | Age: 42
End: 2022-09-05

## 2022-09-05 LAB
ANION GAP SERPL CALC-SCNC: 6 MMOL/L (ref 0–18)
ANION GAP SERPL CALC-SCNC: 6 MMOL/L (ref 0–18)
BASOPHILS # BLD: 0 X10(3) UL (ref 0–0.2)
BASOPHILS # BLD: 0 X10(3) UL (ref 0–0.2)
BASOPHILS NFR BLD: 0 %
BASOPHILS NFR BLD: 0 %
BUN BLD-MCNC: 22 MG/DL (ref 7–18)
BUN BLD-MCNC: 22 MG/DL (ref 7–18)
CALCIUM BLD-MCNC: 8.7 MG/DL (ref 8.5–10.1)
CALCIUM BLD-MCNC: 8.7 MG/DL (ref 8.5–10.1)
CHLORIDE SERPL-SCNC: 104 MMOL/L (ref 98–112)
CHLORIDE SERPL-SCNC: 104 MMOL/L (ref 98–112)
CO2 SERPL-SCNC: 28 MMOL/L (ref 21–32)
CO2 SERPL-SCNC: 28 MMOL/L (ref 21–32)
CREAT BLD-MCNC: 0.46 MG/DL
CREAT BLD-MCNC: 0.46 MG/DL
EOSINOPHIL # BLD: 0 X10(3) UL (ref 0–0.7)
EOSINOPHIL # BLD: 0 X10(3) UL (ref 0–0.7)
EOSINOPHIL NFR BLD: 0 %
EOSINOPHIL NFR BLD: 0 %
ERYTHROCYTE [DISTWIDTH] IN BLOOD BY AUTOMATED COUNT: 17.6 %
ERYTHROCYTE [DISTWIDTH] IN BLOOD BY AUTOMATED COUNT: 17.6 %
GFR SERPLBLD BASED ON 1.73 SQ M-ARVRAT: 124 ML/MIN/1.73M2 (ref 60–?)
GFR SERPLBLD BASED ON 1.73 SQ M-ARVRAT: 124 ML/MIN/1.73M2 (ref 60–?)
GLUCOSE BLD-MCNC: 107 MG/DL (ref 70–99)
GLUCOSE BLD-MCNC: 107 MG/DL (ref 70–99)
HCT VFR BLD AUTO: 34.8 %
HCT VFR BLD AUTO: 34.8 %
HGB BLD-MCNC: 10.2 G/DL
HGB BLD-MCNC: 10.2 G/DL
LYMPHOCYTES NFR BLD: 0.43 X10(3) UL (ref 1–4)
LYMPHOCYTES NFR BLD: 0.43 X10(3) UL (ref 1–4)
LYMPHOCYTES NFR BLD: 5 %
LYMPHOCYTES NFR BLD: 5 %
MCH RBC QN AUTO: 23.9 PG (ref 26–34)
MCH RBC QN AUTO: 23.9 PG (ref 26–34)
MCHC RBC AUTO-ENTMCNC: 29.3 G/DL (ref 31–37)
MCHC RBC AUTO-ENTMCNC: 29.3 G/DL (ref 31–37)
MCV RBC AUTO: 81.7 FL
MCV RBC AUTO: 81.7 FL
MONOCYTES # BLD: 0.26 X10(3) UL (ref 0.1–1)
MONOCYTES # BLD: 0.26 X10(3) UL (ref 0.1–1)
MONOCYTES NFR BLD: 3 %
MONOCYTES NFR BLD: 3 %
MORPHOLOGY: NORMAL
MORPHOLOGY: NORMAL
NEUTROPHILS # BLD AUTO: 6.99 X10 (3) UL (ref 1.5–7.7)
NEUTROPHILS # BLD AUTO: 6.99 X10 (3) UL (ref 1.5–7.7)
NEUTROPHILS NFR BLD: 91 %
NEUTROPHILS NFR BLD: 91 %
NEUTS BAND NFR BLD: 1 %
NEUTS BAND NFR BLD: 1 %
NEUTS HYPERSEG # BLD: 7.82 X10(3) UL (ref 1.5–7.7)
NEUTS HYPERSEG # BLD: 7.82 X10(3) UL (ref 1.5–7.7)
OSMOLALITY SERPL CALC.SUM OF ELEC: 290 MOSM/KG (ref 275–295)
OSMOLALITY SERPL CALC.SUM OF ELEC: 290 MOSM/KG (ref 275–295)
PLATELET # BLD AUTO: 248 10(3)UL (ref 150–450)
PLATELET # BLD AUTO: 248 10(3)UL (ref 150–450)
PLATELET MORPHOLOGY: NORMAL
PLATELET MORPHOLOGY: NORMAL
POTASSIUM SERPL-SCNC: 4 MMOL/L (ref 3.5–5.1)
POTASSIUM SERPL-SCNC: 4 MMOL/L (ref 3.5–5.1)
RBC # BLD AUTO: 4.26 X10(6)UL
RBC # BLD AUTO: 4.26 X10(6)UL
SODIUM SERPL-SCNC: 138 MMOL/L (ref 136–145)
SODIUM SERPL-SCNC: 138 MMOL/L (ref 136–145)
TOTAL CELLS COUNTED BLD: 100
TOTAL CELLS COUNTED BLD: 100
WBC # BLD AUTO: 8.5 X10(3) UL (ref 4–11)
WBC # BLD AUTO: 8.5 X10(3) UL (ref 4–11)

## 2022-09-05 PROCEDURE — 99222 1ST HOSP IP/OBS MODERATE 55: CPT | Performed by: NEUROLOGICAL SURGERY

## 2022-09-05 PROCEDURE — 99232 SBSQ HOSP IP/OBS MODERATE 35: CPT | Performed by: HOSPITALIST

## 2022-09-05 NOTE — PLAN OF CARE
Assumed care of pt @ 0730. Pt is A/Ox 3-4, forgetful at times. On RA, VSS, SR on tele. IV saline locked  Tolerating diet. Pt states HA is mild to moderate at times when it comes. Up as tolerated with 1 assist.  Intake/outputs WNL. Plan neuro and hem/onc to see tomorrow. EEG tomorrow    Updated POC with patient and family. Will continue to monitor. Problem: NEUROLOGICAL - ADULT  Goal: Achieves stable or improved neurological status  Description: INTERVENTIONS  - Assess for and report changes in neurological status  - Initiate measures to prevent increased intracranial pressure  - Maintain blood pressure and fluid volume within ordered parameters to optimize cerebral perfusion and minimize risk of hemorrhage  - Monitor temperature, glucose, and sodium.  Initiate appropriate interventions as ordered  Outcome: Progressing  Goal: Absence of seizures  Description: INTERVENTIONS  - Monitor for seizure activity  - Administer anti-seizure medications as ordered  - Monitor neurological status  Outcome: Progressing  Goal: Achieves maximal functionality and self care  Description: INTERVENTIONS  - Monitor swallowing and airway patency with patient fatigue and changes in neurological status  - Encourage and assist patient to increase activity and self care with guidance from PT/OT  - Encourage visually impaired, hearing impaired and aphasic patients to use assistive/communication devices  Outcome: Progressing     Problem: Impaired Functional Mobility  Goal: Achieve highest/safest level of mobility/gait  Description: Interventions:  - Assess patient's functional ability and stability  - Promote increasing activity/tolerance for mobility and gait  - Educate and engage patient/family in tolerated activity level and precautions  - Recommend patient transfer to bedside chair toward strongest side  Outcome: Progressing     Problem: Impaired Cognition  Goal: Patient will exhibit improved attention, thought processing and/or memory  Description: Interventions:  - Allow additional time for processing after asking questions or providing instructions  Outcome: Progressing     Problem: SAFETY ADULT - FALL  Goal: Free from fall injury  Description: INTERVENTIONS:  - Assess pt frequently for physical needs  - Identify cognitive and physical deficits and behaviors that affect risk of falls.   - [place] fall precautions as indicated by assessment.  - Educate pt/family on patient safety including physical limitations  - Instruct pt to call for assistance with activity based on assessment  - Modify environment to reduce risk of injury  - Provide assistive devices as appropriate  - Consider OT/PT consult to assist with strengthening/mobility  - Encourage toileting schedule  Outcome: Progressing

## 2022-09-05 NOTE — PROGRESS NOTES
NURSING ADMISSION NOTE      Patient admitted via cart at approx 2300. Oriented to room. Safety precautions initiated. Bed in low position. Call light in reach. Patient A&Ox4, drowsy but easily arousable. LUE and LLE numbness - baseline per patient. Seizure precautions. Decadron and Keppra given. Headache improved. Neurosx c/s. Patient and  updated on POC.

## 2022-09-05 NOTE — ED QUICK NOTES
Orders for admission, patient is aware of plan and ready to go upstairs.  Any questions, please call ED RN Terri Murphy at extension [phone]     Patient Covid vaccination status: Fully vaccinated and immunocompromised     COVID Test Ordered in ED: Rapid SARS-CoV-2 by PCR    COVID Suspicion at Admission: No risk with negative rapid    Running Infusions:  None    Mental Status/LOC at time of transport: alert    Other pertinent information:   CIWA score: N/A   NIH score:  N/A

## 2022-09-05 NOTE — ED INITIAL ASSESSMENT (HPI)
Patient to the ER for possible seizure and pain. Patient has hx of seizures, newly dx brain tumor.  228 glucose in route

## 2022-09-06 ENCOUNTER — APPOINTMENT (OUTPATIENT)
Dept: HEMATOLOGY/ONCOLOGY | Facility: HOSPITAL | Age: 42
End: 2022-09-06
Attending: INTERNAL MEDICINE
Payer: OTHER GOVERNMENT

## 2022-09-06 ENCOUNTER — NURSE ONLY (OUTPATIENT)
Dept: ELECTROPHYSIOLOGY | Facility: HOSPITAL | Age: 42
DRG: 054 | End: 2022-09-06
Attending: HOSPITALIST
Payer: OTHER GOVERNMENT

## 2022-09-06 ENCOUNTER — NURSE ONLY (OUTPATIENT)
Dept: ELECTROPHYSIOLOGY | Facility: HOSPITAL | Age: 42
End: 2022-09-06
Attending: HOSPITALIST
Payer: OTHER GOVERNMENT

## 2022-09-06 LAB
ATRIAL RATE: 65 BPM
ATRIAL RATE: 65 BPM
LEVETIRACETAM (KEPPRA): 8 UG/ML
LEVETIRACETAM (KEPPRA): 8 UG/ML
P AXIS: 36 DEGREES
P AXIS: 36 DEGREES
P-R INTERVAL: 130 MS
P-R INTERVAL: 130 MS
Q-T INTERVAL: 366 MS
Q-T INTERVAL: 366 MS
QRS DURATION: 76 MS
QRS DURATION: 76 MS
QTC CALCULATION (BEZET): 380 MS
QTC CALCULATION (BEZET): 380 MS
R AXIS: -2 DEGREES
R AXIS: -2 DEGREES
T AXIS: 45 DEGREES
T AXIS: 45 DEGREES
VENTRICULAR RATE: 65 BPM
VENTRICULAR RATE: 65 BPM

## 2022-09-06 PROCEDURE — 99222 1ST HOSP IP/OBS MODERATE 55: CPT | Performed by: OTHER

## 2022-09-06 PROCEDURE — 99243 OFF/OP CNSLTJ NEW/EST LOW 30: CPT | Performed by: INTERNAL MEDICINE

## 2022-09-06 PROCEDURE — 99231 SBSQ HOSP IP/OBS SF/LOW 25: CPT | Performed by: NEUROLOGICAL SURGERY

## 2022-09-06 PROCEDURE — 95819 EEG AWAKE AND ASLEEP: CPT | Performed by: OTHER

## 2022-09-06 PROCEDURE — 99232 SBSQ HOSP IP/OBS MODERATE 35: CPT | Performed by: HOSPITALIST

## 2022-09-06 RX ORDER — ACYCLOVIR 800 MG/1
800 TABLET ORAL
Status: DISCONTINUED | OUTPATIENT
Start: 2022-09-06 | End: 2022-09-06

## 2022-09-06 RX ORDER — MORPHINE SULFATE 2 MG/ML
2 INJECTION, SOLUTION INTRAMUSCULAR; INTRAVENOUS EVERY 2 HOUR PRN
Status: DISCONTINUED | OUTPATIENT
Start: 2022-09-06 | End: 2022-09-08

## 2022-09-06 RX ORDER — MORPHINE SULFATE 2 MG/ML
0.5 INJECTION, SOLUTION INTRAMUSCULAR; INTRAVENOUS EVERY 2 HOUR PRN
Status: DISCONTINUED | OUTPATIENT
Start: 2022-09-06 | End: 2022-09-08

## 2022-09-06 RX ORDER — MORPHINE SULFATE 2 MG/ML
1 INJECTION, SOLUTION INTRAMUSCULAR; INTRAVENOUS EVERY 2 HOUR PRN
Status: DISCONTINUED | OUTPATIENT
Start: 2022-09-06 | End: 2022-09-08

## 2022-09-06 RX ORDER — HYDROCODONE BITARTRATE AND ACETAMINOPHEN 5; 325 MG/1; MG/1
1 TABLET ORAL EVERY 6 HOURS PRN
Status: DISCONTINUED | OUTPATIENT
Start: 2022-09-06 | End: 2022-09-08

## 2022-09-06 RX ORDER — GABAPENTIN 100 MG/1
100 CAPSULE ORAL 3 TIMES DAILY
Status: DISCONTINUED | OUTPATIENT
Start: 2022-09-06 | End: 2022-09-08

## 2022-09-06 NOTE — PLAN OF CARE
Assumed care at 1930  L deficits noted  Seizure precautions in place  Complaint of headache overnight  Up to bedside commode with 1 - direction needed  Plan for heme/onc to see  Headache increased this AM - Dr. Malcolm Recio notified - order for Norco placed

## 2022-09-06 NOTE — PALLIATIVE CARE NOTE
Palliative Care Consult request from Dr. Helen Khalil noted requesting discussion for goals of care and advanced care planning. Discussed consultation request with patient's spouse via phone today as he is now out of the hospital. Family meeting planned for tomorrow at 0900 to meet in the patient's room. Consultation note to follow meeting patient and family.       Thank you for the referral.    PABLO Maravilla, PABLO  Palliative Care   Phone: [phone]     9/6/2022  4:07 PM

## 2022-09-07 PROBLEM — Z71.89 COUNSELING REGARDING ADVANCE CARE PLANNING AND GOALS OF CARE: Status: ACTIVE | Noted: 2022-09-07

## 2022-09-07 PROBLEM — Z51.5 PALLIATIVE CARE ENCOUNTER: Status: ACTIVE | Noted: 2022-09-07

## 2022-09-07 PROCEDURE — 99232 SBSQ HOSP IP/OBS MODERATE 35: CPT | Performed by: OTHER

## 2022-09-07 PROCEDURE — 99497 ADVNCD CARE PLAN 30 MIN: CPT | Performed by: CLINICAL NURSE SPECIALIST

## 2022-09-07 PROCEDURE — 99232 SBSQ HOSP IP/OBS MODERATE 35: CPT | Performed by: HOSPITALIST

## 2022-09-07 PROCEDURE — 99222 1ST HOSP IP/OBS MODERATE 55: CPT | Performed by: CLINICAL NURSE SPECIALIST

## 2022-09-07 RX ORDER — LEVETIRACETAM 250 MG/1
250 TABLET ORAL 2 TIMES DAILY
Qty: 60 TABLET | Refills: 2 | Status: SHIPPED | OUTPATIENT
Start: 2022-09-07 | End: 2022-09-08

## 2022-09-07 NOTE — PLAN OF CARE
Pt AxO4  RA  NSR  Pain present; neck pain, R-side head  -mild/moderate; norco gives some relief, ice pack gives relief  1-2x assist with walker  -PT evaluated  -OT evaluated    HEENT  -L/back of neck, chest   =redness/vesicles noted  IV antiviral  Seizure precautions  Isolation precautions  Pt and family informed of POC, all questions answered

## 2022-09-07 NOTE — PLAN OF CARE
Pt received alert and oriented x4, primarly Parr speaking but able to make needs known. NSR on telemetry. PRN morphine given for headache with relief. Shingles lesions/blisters to Rt neck, open to air. Call light within reach. Isolation, seizure, and fall precautions maintained.

## 2022-09-07 NOTE — PLAN OF CARE
Assumed care of pt @ 30. Pt is A/Ox 4. Forgetful at times. Pt continues with delayed responses. On RA, VSS, SR on tele. IV saline locked. Tolerating diet. PT/OT to see  Pt states pain is improving  Up as tolerated with SBA stand pivot to [facility] or chair. Intake/outputs WNL. Plan: continue airborne and contact iso, restart dulce maria. Updated POC with patient and family. Will continue to monitor. Problem: NEUROLOGICAL - ADULT  Goal: Achieves stable or improved neurological status  Description: INTERVENTIONS  - Assess for and report changes in neurological status  - Initiate measures to prevent increased intracranial pressure  - Maintain blood pressure and fluid volume within ordered parameters to optimize cerebral perfusion and minimize risk of hemorrhage  - Monitor temperature, glucose, and sodium.  Initiate appropriate interventions as ordered  Outcome: Progressing  Goal: Absence of seizures  Description: INTERVENTIONS  - Monitor for seizure activity  - Administer anti-seizure medications as ordered  - Monitor neurological status  Outcome: Progressing  Goal: Achieves maximal functionality and self care  Description: INTERVENTIONS  - Monitor swallowing and airway patency with patient fatigue and changes in neurological status  - Encourage and assist patient to increase activity and self care with guidance from PT/OT  - Encourage visually impaired, hearing impaired and aphasic patients to use assistive/communication devices  Outcome: Progressing     Problem: Impaired Functional Mobility  Goal: Achieve highest/safest level of mobility/gait  Description: Interventions:  - Assess patient's functional ability and stability  - Promote increasing activity/tolerance for mobility and gait  - Educate and engage patient/family in tolerated activity level and precautions  - Recommend use of  RW for transfers and ambulation  Outcome: Progressing     Problem: Impaired Cognition  Goal: Patient will exhibit improved attention, thought processing and/or memory  Description: Interventions:  - For patients with neglect, place hot drinks on the unaffected side  Outcome: Progressing     Problem: SAFETY ADULT - FALL  Goal: Free from fall injury  Description: INTERVENTIONS:  - Assess pt frequently for physical needs  - Identify cognitive and physical deficits and behaviors that affect risk of falls.   - [place] fall precautions as indicated by assessment.  - Educate pt/family on patient safety including physical limitations  - Instruct pt to call for assistance with activity based on assessment  - Modify environment to reduce risk of injury  - Provide assistive devices as appropriate  - Consider OT/PT consult to assist with strengthening/mobility  - Encourage toileting schedule  Outcome: Progressing

## 2022-09-08 ENCOUNTER — APPOINTMENT (OUTPATIENT)
Dept: PHYSICAL THERAPY | Age: 42
End: 2022-09-08
Attending: HOSPITALIST

## 2022-09-08 VITALS
HEART RATE: 85 BPM | WEIGHT: 134.94 LBS | OXYGEN SATURATION: 97 % | RESPIRATION RATE: 16 BRPM | TEMPERATURE: 98 F | SYSTOLIC BLOOD PRESSURE: 155 MMHG | BODY MASS INDEX: 25 KG/M2 | DIASTOLIC BLOOD PRESSURE: 100 MMHG

## 2022-09-08 PROCEDURE — 99498 ADVNCD CARE PLAN ADDL 30 MIN: CPT | Performed by: CLINICAL NURSE SPECIALIST

## 2022-09-08 PROCEDURE — 99497 ADVNCD CARE PLAN 30 MIN: CPT | Performed by: CLINICAL NURSE SPECIALIST

## 2022-09-08 PROCEDURE — 99232 SBSQ HOSP IP/OBS MODERATE 35: CPT | Performed by: CLINICAL NURSE SPECIALIST

## 2022-09-08 PROCEDURE — 99239 HOSP IP/OBS DSCHRG MGMT >30: CPT | Performed by: HOSPITALIST

## 2022-09-08 RX ORDER — GABAPENTIN 100 MG/1
200 CAPSULE ORAL 3 TIMES DAILY
Status: DISCONTINUED | OUTPATIENT
Start: 2022-09-08 | End: 2022-09-08

## 2022-09-08 RX ORDER — VALACYCLOVIR HYDROCHLORIDE 500 MG/1
1000 TABLET, FILM COATED ORAL EVERY 8 HOURS SCHEDULED
Status: DISCONTINUED | OUTPATIENT
Start: 2022-09-08 | End: 2022-09-08

## 2022-09-08 RX ORDER — GABAPENTIN 100 MG/1
100 CAPSULE ORAL ONCE
Status: COMPLETED | OUTPATIENT
Start: 2022-09-08 | End: 2022-09-08

## 2022-09-08 RX ORDER — LEVETIRACETAM 250 MG/1
250 TABLET ORAL 2 TIMES DAILY
Qty: 60 TABLET | Refills: 0 | Status: SHIPPED | OUTPATIENT
Start: 2022-09-08

## 2022-09-08 RX ORDER — GABAPENTIN 100 MG/1
200 CAPSULE ORAL 3 TIMES DAILY
Qty: 180 CAPSULE | Refills: 0 | Status: SHIPPED | OUTPATIENT
Start: 2022-09-08 | End: 2022-09-20

## 2022-09-08 RX ORDER — HYDROCODONE BITARTRATE AND ACETAMINOPHEN 10; 325 MG/1; MG/1
1 TABLET ORAL EVERY 6 HOURS PRN
Qty: 30 TABLET | Refills: 0 | Status: SHIPPED | OUTPATIENT
Start: 2022-09-08 | End: 2022-09-20 | Stop reason: DRUGHIGH

## 2022-09-08 RX ORDER — HYDROCODONE BITARTRATE AND ACETAMINOPHEN 10; 325 MG/1; MG/1
1 TABLET ORAL EVERY 6 HOURS PRN
Status: DISCONTINUED | OUTPATIENT
Start: 2022-09-08 | End: 2022-09-08

## 2022-09-08 RX ORDER — HYDROCODONE BITARTRATE AND ACETAMINOPHEN 10; 325 MG/1; MG/1
2 TABLET ORAL EVERY 6 HOURS PRN
Status: DISCONTINUED | OUTPATIENT
Start: 2022-09-08 | End: 2022-09-08

## 2022-09-08 RX ORDER — VALACYCLOVIR HYDROCHLORIDE 1 G/1
1000 TABLET, FILM COATED ORAL 3 TIMES DAILY
Qty: 36 TABLET | Refills: 0 | Status: SHIPPED | OUTPATIENT
Start: 2022-09-08 | End: 2022-09-20

## 2022-09-08 NOTE — CM/SW NOTE
CORI made aware of [facility] recommendations, from last admission SW unable to find in network [facility] however pt preferred outpt therapy. CORI sent [facility] referrals with current clinical and sent to 25 providers. CORI to meet with pt to discuss.      Carmenza Lambert, LSW  Discharge 2011 [facility]

## 2022-09-08 NOTE — CM/SW NOTE
Trinity Ugalde able to provide [facility] services at [place]. Aware of dc orders placed. Amber to place call to pt on start of care. RN notified, placed on discharge paperwork.      ETCOR Chiu  Discharge 2011 [facility]

## 2022-09-08 NOTE — PLAN OF CARE
Problem: GBM, shingles  Data: Patient alert and oriented overnight, rating pain to right neck and head where shingles are noted, at an 8 on 1-10 pain scale. Patient up in room with assist with walker, left sided weakness noted, voiding freely, vital signs stable, NSR on tele. Action: Due medications given, IV acyclovir overnight, prn pain medication as requested. All patient's needs attended to. Education (patient/family): Patient updated on plan of care. Plan for DC back home with home health when appropriate. Response: Patient verbalizes understanding of plan of care and appears to be resting comfortably at this time, will continue to monitor. Problem: SAFETY ADULT - FALL  Goal: Free from fall injury  Description: INTERVENTIONS:  - Assess pt frequently for physical needs  - Identify cognitive and physical deficits and behaviors that affect risk of falls.   - [place] fall precautions as indicated by assessment.  - Educate pt/family on patient safety including physical limitations  - Instruct pt to call for assistance with activity based on assessment  - Modify environment to reduce risk of injury  - Provide assistive devices as appropriate  - Consider OT/PT consult to assist with strengthening/mobility  - Encourage toileting schedule  Outcome: Progressing     Problem: Patient/Family Goals  Goal: Patient/Family Long Term Goal  Description: Patient's Long Term Goal: DC home    Interventions:  - comply with plan of care  - See additional Care Plan goals for specific interventions  Outcome: Progressing  Goal: Patient/Family Short Term Goal  Description: Patient's Short Term Goal: good pain control    Interventions:   - prn pain medication  - See additional Care Plan goals for specific interventions  Outcome: Progressing     Problem: PAIN - ADULT  Goal: Verbalizes/displays adequate comfort level or patient's stated pain goal  Description: INTERVENTIONS:  - Encourage pt to monitor pain and request assistance  - Assess pain using appropriate pain scale  - Administer analgesics based on type and severity of pain and evaluate response  - Implement non-pharmacological measures as appropriate and evaluate response  - Consider cultural and social influences on pain and pain management  - Manage/alleviate anxiety  - Utilize distraction and/or relaxation techniques  - Monitor for opioid side effects  - Notify MD/LIP if interventions unsuccessful or patient reports new pain  - Anticipate increased pain with activity and pre-medicate as appropriate  Outcome: Progressing

## 2022-09-08 NOTE — PROGRESS NOTES
Oncology note:    Patient has been diagnosed with shingles. Chemo on hold until the rash crusts over. Continue current steroid dosing. Will follow peripherally. Please call with questions. Stevo Gifford M.D.   [facility]  Co-Medical Director, [place] [facility]

## 2022-09-13 ENCOUNTER — APPOINTMENT (OUTPATIENT)
Dept: PHYSICAL THERAPY | Age: 42
End: 2022-09-13
Attending: HOSPITALIST

## 2022-09-14 DIAGNOSIS — C71.9 GBM (GLIOBLASTOMA MULTIFORME) (HCC): Primary | ICD-10-CM

## 2022-09-15 ENCOUNTER — SOCIAL WORK SERVICES (OUTPATIENT)
Dept: HEMATOLOGY/ONCOLOGY | Facility: HOSPITAL | Age: 42
End: 2022-09-15

## 2022-09-15 ENCOUNTER — APPOINTMENT (OUTPATIENT)
Dept: PHYSICAL THERAPY | Age: 42
End: 2022-09-15
Attending: HOSPITALIST

## 2022-09-15 NOTE — PROGRESS NOTES
faxed order to [facility] (X#[phone] P#[phone]); Nancy Walls confirmed that they will reach out to  for coordination.

## 2022-09-16 ENCOUNTER — TELEPHONE (OUTPATIENT)
Dept: HEMATOLOGY/ONCOLOGY | Facility: HOSPITAL | Age: 42
End: 2022-09-16

## 2022-09-16 RX ORDER — DEXAMETHASONE 4 MG/1
4 TABLET ORAL 2 TIMES DAILY WITH MEALS
Qty: 60 TABLET | Refills: 1 | Status: SHIPPED | OUTPATIENT
Start: 2022-09-16

## 2022-09-16 NOTE — TELEPHONE ENCOUNTER
Allie Armenta, RN 9/16/2022 4:09 PM CDT      ----- Message -----  From: Clara Swanson  Sent: 9/16/2022 4:02 PM CDT  To: Viviano Bumpers Bcn Broderick Rns  Subject: Hospitalized     Sir,,   Can you prescribe more steroid for my wife ? Melvin Willard  Thanks

## 2022-09-19 ENCOUNTER — APPOINTMENT (OUTPATIENT)
Dept: CT IMAGING | Facility: HOSPITAL | Age: 42
End: 2022-09-19
Attending: EMERGENCY MEDICINE

## 2022-09-19 ENCOUNTER — APPOINTMENT (OUTPATIENT)
Dept: GENERAL RADIOLOGY | Facility: HOSPITAL | Age: 42
End: 2022-09-19
Attending: EMERGENCY MEDICINE

## 2022-09-19 ENCOUNTER — HOSPITAL ENCOUNTER (EMERGENCY)
Facility: HOSPITAL | Age: 42
Discharge: HOME | End: 2022-09-19
Attending: EMERGENCY MEDICINE

## 2022-09-19 VITALS
HEART RATE: 73 BPM | SYSTOLIC BLOOD PRESSURE: 145 MMHG | DIASTOLIC BLOOD PRESSURE: 90 MMHG | BODY MASS INDEX: 25 KG/M2 | WEIGHT: 134.5 LBS | RESPIRATION RATE: 15 BRPM | OXYGEN SATURATION: 97 % | TEMPERATURE: 99 F

## 2022-09-19 DIAGNOSIS — B02.8 HERPES ZOSTER WITH COMPLICATION: ICD-10-CM

## 2022-09-19 DIAGNOSIS — G44.209 TENSION HEADACHE: Primary | ICD-10-CM

## 2022-09-19 LAB
ALBUMIN SERPL-MCNC: 3.3 G/DL (ref 3.4–5)
ALBUMIN/GLOB SERPL: 1 {RATIO} (ref 1–2)
ALP LIVER SERPL-CCNC: 59 U/L
ALT SERPL-CCNC: 50 U/L
ANION GAP SERPL CALC-SCNC: 6 MMOL/L (ref 0–18)
AST SERPL-CCNC: 20 U/L (ref 15–37)
ATRIAL RATE: 83 BPM
BASOPHILS # BLD AUTO: 0 X10(3) UL (ref 0–0.2)
BASOPHILS NFR BLD AUTO: 0 %
BILIRUB SERPL-MCNC: 0.3 MG/DL (ref 0.1–2)
BUN BLD-MCNC: 24 MG/DL (ref 7–18)
CALCIUM BLD-MCNC: 8.9 MG/DL (ref 8.5–10.1)
CHLORIDE SERPL-SCNC: 102 MMOL/L (ref 98–112)
CO2 SERPL-SCNC: 28 MMOL/L (ref 21–32)
CREAT BLD-MCNC: 0.58 MG/DL
EOSINOPHIL # BLD AUTO: 0.02 X10(3) UL (ref 0–0.7)
EOSINOPHIL NFR BLD AUTO: 0.5 %
ERYTHROCYTE [DISTWIDTH] IN BLOOD BY AUTOMATED COUNT: 19.5 %
GFR SERPLBLD BASED ON 1.73 SQ M-ARVRAT: 117 ML/MIN/1.73M2 (ref 60–?)
GLOBULIN PLAS-MCNC: 3.2 G/DL (ref 2.8–4.4)
GLUCOSE BLD-MCNC: 167 MG/DL (ref 70–99)
HCT VFR BLD AUTO: 36.3 %
HGB BLD-MCNC: 11 G/DL
IMM GRANULOCYTES # BLD AUTO: 0.14 X10(3) UL (ref 0–1)
IMM GRANULOCYTES NFR BLD: 3.2 %
LYMPHOCYTES # BLD AUTO: 0.79 X10(3) UL (ref 1–4)
LYMPHOCYTES NFR BLD AUTO: 18 %
MCH RBC QN AUTO: 24.6 PG (ref 26–34)
MCHC RBC AUTO-ENTMCNC: 30.3 G/DL (ref 31–37)
MCV RBC AUTO: 81.2 FL
MONOCYTES # BLD AUTO: 0.21 X10(3) UL (ref 0.1–1)
MONOCYTES NFR BLD AUTO: 4.8 %
NEUTROPHILS # BLD AUTO: 3.22 X10 (3) UL (ref 1.5–7.7)
NEUTROPHILS # BLD AUTO: 3.22 X10(3) UL (ref 1.5–7.7)
NEUTROPHILS NFR BLD AUTO: 73.5 %
OSMOLALITY SERPL CALC.SUM OF ELEC: 290 MOSM/KG (ref 275–295)
P AXIS: 40 DEGREES
P-R INTERVAL: 144 MS
PLATELET # BLD AUTO: 216 10(3)UL (ref 150–450)
POTASSIUM SERPL-SCNC: 3.5 MMOL/L (ref 3.5–5.1)
PROT SERPL-MCNC: 6.5 G/DL (ref 6.4–8.2)
Q-T INTERVAL: 358 MS
QRS DURATION: 82 MS
QTC CALCULATION (BEZET): 420 MS
R AXIS: -18 DEGREES
RBC # BLD AUTO: 4.47 X10(6)UL
SARS-COV-2 RNA RESP QL NAA+PROBE: NOT DETECTED
SODIUM SERPL-SCNC: 136 MMOL/L (ref 136–145)
T AXIS: 26 DEGREES
VENTRICULAR RATE: 83 BPM
WBC # BLD AUTO: 4.4 X10(3) UL (ref 4–11)

## 2022-09-19 PROCEDURE — 85025 COMPLETE CBC W/AUTO DIFF WBC: CPT | Performed by: EMERGENCY MEDICINE

## 2022-09-19 PROCEDURE — 71045 X-RAY EXAM CHEST 1 VIEW: CPT | Performed by: EMERGENCY MEDICINE

## 2022-09-19 PROCEDURE — 93005 ELECTROCARDIOGRAM TRACING: CPT

## 2022-09-19 PROCEDURE — 99285 EMERGENCY DEPT VISIT HI MDM: CPT

## 2022-09-19 PROCEDURE — 93010 ELECTROCARDIOGRAM REPORT: CPT

## 2022-09-19 PROCEDURE — 80053 COMPREHEN METABOLIC PANEL: CPT | Performed by: EMERGENCY MEDICINE

## 2022-09-19 PROCEDURE — 70450 CT HEAD/BRAIN W/O DYE: CPT | Performed by: EMERGENCY MEDICINE

## 2022-09-19 PROCEDURE — 96374 THER/PROPH/DIAG INJ IV PUSH: CPT

## 2022-09-19 RX ORDER — HYDROCODONE BITARTRATE AND ACETAMINOPHEN 5; 325 MG/1; MG/1
1-2 TABLET ORAL EVERY 6 HOURS PRN
Qty: 10 TABLET | Refills: 0 | Status: SHIPPED | OUTPATIENT
Start: 2022-09-19 | End: 2022-09-24

## 2022-09-19 RX ORDER — MORPHINE SULFATE 4 MG/ML
4 INJECTION, SOLUTION INTRAMUSCULAR; INTRAVENOUS EVERY 30 MIN PRN
Status: DISCONTINUED | OUTPATIENT
Start: 2022-09-19 | End: 2022-09-19

## 2022-09-19 NOTE — ED INITIAL ASSESSMENT (HPI)
Per medics pt called the ambulance due to a headache and full body pain.  Pt has a Hx of brain cancer

## 2022-09-20 ENCOUNTER — SOCIAL WORK SERVICES (OUTPATIENT)
Dept: HEMATOLOGY/ONCOLOGY | Facility: HOSPITAL | Age: 42
End: 2022-09-20

## 2022-09-20 ENCOUNTER — APPOINTMENT (OUTPATIENT)
Dept: PHYSICAL THERAPY | Age: 42
End: 2022-09-20
Attending: HOSPITALIST

## 2022-09-20 ENCOUNTER — OFFICE VISIT (OUTPATIENT)
Dept: HEMATOLOGY/ONCOLOGY | Facility: HOSPITAL | Age: 42
End: 2022-09-20
Attending: INTERNAL MEDICINE
Payer: OTHER GOVERNMENT

## 2022-09-20 VITALS
HEART RATE: 83 BPM | OXYGEN SATURATION: 94 % | SYSTOLIC BLOOD PRESSURE: 132 MMHG | BODY MASS INDEX: 26.81 KG/M2 | RESPIRATION RATE: 16 BRPM | WEIGHT: 142 LBS | TEMPERATURE: 98 F | HEIGHT: 60.98 IN | DIASTOLIC BLOOD PRESSURE: 87 MMHG

## 2022-09-20 DIAGNOSIS — C71.9 GBM (GLIOBLASTOMA MULTIFORME) (HCC): Primary | ICD-10-CM

## 2022-09-20 DIAGNOSIS — B02.23 POST-HERPETIC POLYNEUROPATHY: ICD-10-CM

## 2022-09-20 PROCEDURE — 99215 OFFICE O/P EST HI 40 MIN: CPT | Performed by: INTERNAL MEDICINE

## 2022-09-20 RX ORDER — GABAPENTIN 300 MG/1
300 CAPSULE ORAL 2 TIMES DAILY
Qty: 60 CAPSULE | Refills: 0 | Status: SHIPPED | OUTPATIENT
Start: 2022-09-20 | End: 2022-10-20

## 2022-09-20 RX ORDER — DEXAMETHASONE 2 MG/1
TABLET ORAL
Qty: 49 TABLET | Refills: 0 | Status: SHIPPED | OUTPATIENT
Start: 2022-09-20 | End: 2022-11-01

## 2022-09-20 NOTE — PROGRESS NOTES
Pt here for follow up in neuro clinic. She is taking norco every 6 hours. She is taking dexamethasone 4 mg BID. She complains of swelling, weakness and a itchy face.     Outpatient Oncology Care Plan  Problem list:  knowledge deficit    Problems related to:    disease/disease progression    Interventions:  provided general teaching    Expected outcomes:  understands plan of care    Progress towards outcome:  making progress    Education Record    Learner:  Patient and Spouse  Barriers / Limitations:  None  Method:  Brief focused  Outcome:  Shows understanding  Comments:

## 2022-09-20 NOTE — PATIENT INSTRUCTIONS
Decrease steroids to 4mg once daily for 2 wks, then to 2mg for 2 wks, then 1 wilma for 2 wks then stop  Start Gabapentin medication 300mg twice daily for nerve pain from shingles  Take Norco for pain  Start Temodar chemo  MRI next month scheduled for 10/6  Follow up with Neurology for seizure management  Proceed with Palliative Care at home

## 2022-09-21 DIAGNOSIS — C71.9 GBM (GLIOBLASTOMA MULTIFORME) (HCC): ICD-10-CM

## 2022-09-21 RX ORDER — LEVETIRACETAM 1000 MG/1
1000 TABLET ORAL 2 TIMES DAILY
Qty: 60 TABLET | Refills: 5 | Status: SHIPPED | OUTPATIENT
Start: 2022-09-21

## 2022-09-22 ENCOUNTER — APPOINTMENT (OUTPATIENT)
Dept: PHYSICAL THERAPY | Age: 42
End: 2022-09-22
Attending: HOSPITALIST

## 2022-09-24 RX ORDER — HYDROCODONE BITARTRATE AND ACETAMINOPHEN 5; 325 MG/1; MG/1
1-2 TABLET ORAL EVERY 6 HOURS PRN
Qty: 20 TABLET | Refills: 0 | Status: SHIPPED | OUTPATIENT
Start: 2022-09-24

## 2022-09-27 ENCOUNTER — APPOINTMENT (OUTPATIENT)
Dept: PHYSICAL THERAPY | Age: 42
End: 2022-09-27
Attending: HOSPITALIST

## 2022-09-29 DIAGNOSIS — C71.9 GBM (GLIOBLASTOMA MULTIFORME) (HCC): ICD-10-CM

## 2022-09-29 RX ORDER — TEMOZOLOMIDE 140 MG/1
140 CAPSULE ORAL DAILY
Qty: 5 CAPSULE | Refills: 0 | OUTPATIENT
Start: 2022-09-29

## 2022-09-29 RX ORDER — TEMOZOLOMIDE 100 MG/1
100 CAPSULE ORAL DAILY
Qty: 5 CAPSULE | Refills: 0 | OUTPATIENT
Start: 2022-09-29

## 2022-10-06 ENCOUNTER — HOSPITAL ENCOUNTER (OUTPATIENT)
Dept: MRI IMAGING | Facility: HOSPITAL | Age: 42
Discharge: HOME | End: 2022-10-06
Attending: RADIOLOGY
Payer: OTHER GOVERNMENT

## 2022-10-06 DIAGNOSIS — C71.9 GLIOBLASTOMA (HCC): ICD-10-CM

## 2022-10-06 PROCEDURE — 70553 MRI BRAIN STEM W/O & W/DYE: CPT | Performed by: RADIOLOGY

## 2022-10-06 PROCEDURE — A9575 INJ GADOTERATE MEGLUMI 0.1ML: HCPCS | Performed by: RADIOLOGY

## 2022-10-11 ENCOUNTER — HOSPITAL ENCOUNTER (OUTPATIENT)
Dept: RADIATION ONCOLOGY | Facility: HOSPITAL | Age: 42
Discharge: HOME | End: 2022-10-11
Attending: RADIOLOGY
Payer: OTHER GOVERNMENT

## 2022-10-11 VITALS
OXYGEN SATURATION: 94 % | SYSTOLIC BLOOD PRESSURE: 156 MMHG | RESPIRATION RATE: 18 BRPM | HEART RATE: 72 BPM | DIASTOLIC BLOOD PRESSURE: 101 MMHG | TEMPERATURE: 99 F

## 2022-10-11 DIAGNOSIS — C71.9 GBM (GLIOBLASTOMA MULTIFORME) (HCC): Primary | ICD-10-CM

## 2022-10-11 PROCEDURE — 99213 OFFICE O/P EST LOW 20 MIN: CPT

## 2022-10-11 NOTE — PROGRESS NOTES
[facility] RADIATION ONCOLOGY  FOLLOW UP     PATIENT:   Maria Fernanda Madison      1/5/1980    DIAGNOSIS:   GBM, MGMT methylated, R frontal lobe     CANCER HISTORY   46-year-old woman status post right craniotomy and subtotal resection of a large glioblastoma involving the right frontal lobe, insula, basal ganglia, caudate, temporal lobe, thalamus postoperative imaging only revealed residual nonenhancing tumor. IDH wild-type, but MGMT methylated. Completed 60 Gray in 30 fractions with concurrent temozolomide 8/11/2022. INTERIM HISTORY   Here for follow-up. With . Following treatment was doing fairly well until she developed severe right sided head and neck pain. CT of the head 8/20/2022 showed increasing edema within the brain and so steroids were started. She then developed a very bad case of shingles involving the right mastoid region and right neck. She was treated with 2 weeks of antivirals. She felt less pain on 900 mg gabapentin per day. More recently her hip pain has been increasing. She has an outside palliative care APN who gave her morphine which is only helping a little. She continues on Decadron and is at 4 mg/day. She feels her left-sided weakness has worsened in the past month or 2. She is barely able to ambulate. Denies headaches. No nausea or vomiting. No seizure type activity. MRI 10/6/2022 shows necrotic irregular enhancement around the resection cavity consistent with tumor progression, though given the timing, pseudo progression is a possibility. PHYSICAL EXAM   Facial puffiness from steroids. EOMI. No right-sided weakness. 4 out of 5 left-sided weakness. In a wheelchair. A lot of difficulty getting out of the chair. Healing desquamative changes over the right neck from shingles.       IMPRESSION   The patient is 2 months out from Temodar/radiotherapy  MRI is consistent with tumor progression, though pseudo progression is a possibility as well given the timing and her MGMT methylation status    I discussed the case with Dr. Everette Garcia is to increase dose of gabapentin to get better control of herpetic neuralgia  She is not a good candidate to immediately go to surgical debulking  She has not yet started adjuvant Temodar, so that will be the next step  Will wean steroids to 0  Will likely add Avastin as well, with the understanding that if surgery is required, there will need to be a greater than 30-day washout period    She sees Dr Husam Clark next week    Paramjit Cartwright MD  Radiation Oncology    CC: Yariel Collins MD

## 2022-10-14 ENCOUNTER — TELEPHONE (OUTPATIENT)
Dept: HEMATOLOGY/ONCOLOGY | Facility: HOSPITAL | Age: 42
End: 2022-10-14

## 2022-10-18 ENCOUNTER — NURSE ONLY (OUTPATIENT)
Dept: HEMATOLOGY/ONCOLOGY | Facility: HOSPITAL | Age: 42
End: 2022-10-18

## 2022-10-18 ENCOUNTER — OFFICE VISIT (OUTPATIENT)
Dept: HEMATOLOGY/ONCOLOGY | Facility: HOSPITAL | Age: 42
End: 2022-10-18
Attending: INTERNAL MEDICINE
Payer: OTHER GOVERNMENT

## 2022-10-18 ENCOUNTER — HOSPITAL ENCOUNTER (OUTPATIENT)
Dept: CT IMAGING | Facility: HOSPITAL | Age: 42
Discharge: HOME | End: 2022-10-18
Attending: CLINICAL NURSE SPECIALIST
Payer: OTHER GOVERNMENT

## 2022-10-18 VITALS
HEART RATE: 94 BPM | OXYGEN SATURATION: 93 % | RESPIRATION RATE: 16 BRPM | WEIGHT: 148 LBS | TEMPERATURE: 98 F | HEIGHT: 60.98 IN | DIASTOLIC BLOOD PRESSURE: 98 MMHG | BODY MASS INDEX: 27.94 KG/M2 | SYSTOLIC BLOOD PRESSURE: 136 MMHG

## 2022-10-18 DIAGNOSIS — C71.9 GBM (GLIOBLASTOMA MULTIFORME) (HCC): ICD-10-CM

## 2022-10-18 DIAGNOSIS — R06.02 SHORTNESS OF BREATH: ICD-10-CM

## 2022-10-18 DIAGNOSIS — R07.9 CHEST PAIN, UNSPECIFIED TYPE: ICD-10-CM

## 2022-10-18 DIAGNOSIS — R06.02 SHORTNESS OF BREATH: Primary | ICD-10-CM

## 2022-10-18 LAB
ALBUMIN SERPL-MCNC: 3.4 G/DL (ref 3.4–5)
ALBUMIN/GLOB SERPL: 0.9 {RATIO} (ref 1–2)
ALP LIVER SERPL-CCNC: 69 U/L
ALT SERPL-CCNC: 42 U/L
ANION GAP SERPL CALC-SCNC: 5 MMOL/L (ref 0–18)
AST SERPL-CCNC: 52 U/L (ref 15–37)
BILIRUB SERPL-MCNC: 0.4 MG/DL (ref 0.1–2)
BUN BLD-MCNC: 14 MG/DL (ref 7–18)
CALCIUM BLD-MCNC: 8.5 MG/DL (ref 8.5–10.1)
CHLORIDE SERPL-SCNC: 109 MMOL/L (ref 98–112)
CO2 SERPL-SCNC: 24 MMOL/L (ref 21–32)
CREAT BLD-MCNC: 0.6 MG/DL
GFR SERPLBLD BASED ON 1.73 SQ M-ARVRAT: 116 ML/MIN/1.73M2 (ref 60–?)
GLOBULIN PLAS-MCNC: 3.6 G/DL (ref 2.8–4.4)
GLUCOSE BLD-MCNC: 156 MG/DL (ref 70–99)
OSMOLALITY SERPL CALC.SUM OF ELEC: 290 MOSM/KG (ref 275–295)
POTASSIUM SERPL-SCNC: 4.3 MMOL/L (ref 3.5–5.1)
PROT SERPL-MCNC: 7 G/DL (ref 6.4–8.2)
SODIUM SERPL-SCNC: 138 MMOL/L (ref 136–145)

## 2022-10-18 PROCEDURE — 99215 OFFICE O/P EST HI 40 MIN: CPT | Performed by: INTERNAL MEDICINE

## 2022-10-18 PROCEDURE — 71275 CT ANGIOGRAPHY CHEST: CPT | Performed by: CLINICAL NURSE SPECIALIST

## 2022-10-18 RX ORDER — TEMOZOLOMIDE 100 MG/1
100 CAPSULE ORAL DAILY
Qty: 5 CAPSULE | Refills: 0 | Status: SHIPPED | OUTPATIENT
Start: 2022-10-18 | End: 2022-11-04

## 2022-10-18 RX ORDER — IOHEXOL 350 MG/ML
100 INJECTION, SOLUTION INTRAVENOUS
Status: COMPLETED | OUTPATIENT
Start: 2022-10-18 | End: 2022-10-18

## 2022-10-18 RX ORDER — AZITHROMYCIN 250 MG/1
TABLET, FILM COATED ORAL
Qty: 6 TABLET | Refills: 0 | Status: ON HOLD | OUTPATIENT
Start: 2022-10-18 | End: 2022-10-24

## 2022-10-18 RX ORDER — TEMOZOLOMIDE 140 MG/1
140 CAPSULE ORAL DAILY
Qty: 5 CAPSULE | Refills: 0 | Status: SHIPPED | OUTPATIENT
Start: 2022-10-18 | End: 2022-11-04

## 2022-10-18 RX ADMIN — IOHEXOL 100 ML: 350 INJECTION, SOLUTION INTRAVENOUS at 17:21:00

## 2022-10-18 NOTE — PROGRESS NOTES
Pt here for follow up in neuro oncology clinic. She has not started adjuvant temodar. Pt complains of shingles pain. She is currently on Decadron 2 mg daily. She will reduce to 1 mg tomorrow.       Outpatient Oncology Care Plan  Problem list:  knowledge deficit    Problems related to:    disease/disease progression    Interventions:  provided general teaching    Expected outcomes:  understands plan of care    Progress towards outcome:  making progress    Education Record    Learner:  Patient and Spouse  Barriers / Limitations:  Language  Method:  Brief focused and  utilized  Outcome:  Shows understanding  Comments:

## 2022-10-19 ENCOUNTER — NURSE ONLY (OUTPATIENT)
Dept: HEMATOLOGY/ONCOLOGY | Facility: HOSPITAL | Age: 42
End: 2022-10-19
Attending: INTERNAL MEDICINE
Payer: OTHER GOVERNMENT

## 2022-10-19 LAB
BASOPHILS # BLD AUTO: 0.02 X10(3) UL (ref 0–0.2)
BASOPHILS NFR BLD AUTO: 0.3 %
EOSINOPHIL # BLD AUTO: 0.02 X10(3) UL (ref 0–0.7)
EOSINOPHIL NFR BLD AUTO: 0.3 %
ERYTHROCYTE [DISTWIDTH] IN BLOOD BY AUTOMATED COUNT: 22.1 %
HCT VFR BLD AUTO: 39 %
HGB BLD-MCNC: 12.5 G/DL
IMM GRANULOCYTES # BLD AUTO: 0.08 X10(3) UL (ref 0–1)
IMM GRANULOCYTES NFR BLD: 1.2 %
LYMPHOCYTES # BLD AUTO: 0.8 X10(3) UL (ref 1–4)
LYMPHOCYTES NFR BLD AUTO: 11.9 %
MCH RBC QN AUTO: 27.1 PG (ref 26–34)
MCHC RBC AUTO-ENTMCNC: 32.1 G/DL (ref 31–37)
MCV RBC AUTO: 84.6 FL
MONOCYTES # BLD AUTO: 0.48 X10(3) UL (ref 0.1–1)
MONOCYTES NFR BLD AUTO: 7.1 %
NEUTROPHILS # BLD AUTO: 5.33 X10 (3) UL (ref 1.5–7.7)
NEUTROPHILS # BLD AUTO: 5.33 X10(3) UL (ref 1.5–7.7)
NEUTROPHILS NFR BLD AUTO: 79.2 %
PLATELET # BLD AUTO: 298 10(3)UL (ref 150–450)
PLATELET MORPHOLOGY: NORMAL
RBC # BLD AUTO: 4.61 X10(6)UL
WBC # BLD AUTO: 6.7 X10(3) UL (ref 4–11)

## 2022-10-23 ENCOUNTER — TELEPHONE (OUTPATIENT)
Dept: HEMATOLOGY/ONCOLOGY | Facility: HOSPITAL | Age: 42
End: 2022-10-23

## 2022-10-23 ENCOUNTER — APPOINTMENT (OUTPATIENT)
Dept: CT IMAGING | Facility: HOSPITAL | Age: 42
End: 2022-10-23
Attending: EMERGENCY MEDICINE
Payer: OTHER GOVERNMENT

## 2022-10-23 ENCOUNTER — HOSPITAL ENCOUNTER (INPATIENT)
Facility: HOSPITAL | Age: 42
LOS: 2 days | Discharge: HOME | End: 2022-10-25
Attending: EMERGENCY MEDICINE | Admitting: INTERNAL MEDICINE
Payer: OTHER GOVERNMENT

## 2022-10-23 ENCOUNTER — HOSPITAL ENCOUNTER (INPATIENT)
Facility: HOSPITAL | Age: 42
LOS: 2 days | Discharge: HOME HEALTH | End: 2022-10-25
Attending: EMERGENCY MEDICINE | Admitting: INTERNAL MEDICINE
Payer: OTHER GOVERNMENT

## 2022-10-23 DIAGNOSIS — C71.9 GBM (GLIOBLASTOMA MULTIFORME) (HCC): ICD-10-CM

## 2022-10-23 DIAGNOSIS — G93.6 VASOGENIC EDEMA (HCC): ICD-10-CM

## 2022-10-23 DIAGNOSIS — R90.89 MIDLINE SHIFT OF BRAIN: ICD-10-CM

## 2022-10-23 DIAGNOSIS — R51.9 ACUTE INTRACTABLE HEADACHE, UNSPECIFIED HEADACHE TYPE: Primary | ICD-10-CM

## 2022-10-23 LAB
ALBUMIN SERPL-MCNC: 3.6 G/DL (ref 3.4–5)
ALBUMIN/GLOB SERPL: 1.1 {RATIO} (ref 1–2)
ALP LIVER SERPL-CCNC: 65 U/L
ALT SERPL-CCNC: 37 U/L
ANION GAP SERPL CALC-SCNC: 6 MMOL/L (ref 0–18)
AST SERPL-CCNC: 26 U/L (ref 15–37)
BASOPHILS # BLD AUTO: 0.02 X10(3) UL (ref 0–0.2)
BASOPHILS NFR BLD AUTO: 0.4 %
BILIRUB SERPL-MCNC: 0.5 MG/DL (ref 0.1–2)
BUN BLD-MCNC: 9 MG/DL (ref 7–18)
CALCIUM BLD-MCNC: 9.3 MG/DL (ref 8.5–10.1)
CHLORIDE SERPL-SCNC: 103 MMOL/L (ref 98–112)
CO2 SERPL-SCNC: 32 MMOL/L (ref 21–32)
CREAT BLD-MCNC: 0.62 MG/DL
EOSINOPHIL # BLD AUTO: 0.1 X10(3) UL (ref 0–0.7)
EOSINOPHIL NFR BLD AUTO: 1.8 %
ERYTHROCYTE [DISTWIDTH] IN BLOOD BY AUTOMATED COUNT: 21.8 %
GFR SERPLBLD BASED ON 1.73 SQ M-ARVRAT: 115 ML/MIN/1.73M2 (ref 60–?)
GLOBULIN PLAS-MCNC: 3.3 G/DL (ref 2.8–4.4)
GLUCOSE BLD-MCNC: 94 MG/DL (ref 70–99)
HCT VFR BLD AUTO: 41.4 %
HGB BLD-MCNC: 13.1 G/DL
IMM GRANULOCYTES # BLD AUTO: 0.06 X10(3) UL (ref 0–1)
IMM GRANULOCYTES NFR BLD: 1.1 %
LYMPHOCYTES # BLD AUTO: 1.33 X10(3) UL (ref 1–4)
LYMPHOCYTES NFR BLD AUTO: 24.3 %
MCH RBC QN AUTO: 26.8 PG (ref 26–34)
MCHC RBC AUTO-ENTMCNC: 31.6 G/DL (ref 31–37)
MCV RBC AUTO: 84.7 FL
MONOCYTES # BLD AUTO: 0.56 X10(3) UL (ref 0.1–1)
MONOCYTES NFR BLD AUTO: 10.2 %
NEUTROPHILS # BLD AUTO: 3.41 X10 (3) UL (ref 1.5–7.7)
NEUTROPHILS # BLD AUTO: 3.41 X10(3) UL (ref 1.5–7.7)
NEUTROPHILS NFR BLD AUTO: 62.2 %
OSMOLALITY SERPL CALC.SUM OF ELEC: 290 MOSM/KG (ref 275–295)
PLATELET # BLD AUTO: 257 10(3)UL (ref 150–450)
POTASSIUM SERPL-SCNC: 3.5 MMOL/L (ref 3.5–5.1)
PROT SERPL-MCNC: 6.9 G/DL (ref 6.4–8.2)
RBC # BLD AUTO: 4.89 X10(6)UL
SARS-COV-2 RNA RESP QL NAA+PROBE: NOT DETECTED
SODIUM SERPL-SCNC: 141 MMOL/L (ref 136–145)
WBC # BLD AUTO: 5.5 X10(3) UL (ref 4–11)

## 2022-10-23 PROCEDURE — 70450 CT HEAD/BRAIN W/O DYE: CPT | Performed by: EMERGENCY MEDICINE

## 2022-10-23 PROCEDURE — 99291 CRITICAL CARE FIRST HOUR: CPT | Performed by: INTERNAL MEDICINE

## 2022-10-23 RX ORDER — DEXAMETHASONE SODIUM PHOSPHATE 10 MG/ML
10 INJECTION, SOLUTION INTRAMUSCULAR; INTRAVENOUS ONCE
Status: COMPLETED | OUTPATIENT
Start: 2022-10-23 | End: 2022-10-23

## 2022-10-23 RX ORDER — HYDRALAZINE HYDROCHLORIDE 20 MG/ML
10 INJECTION INTRAMUSCULAR; INTRAVENOUS ONCE
Status: COMPLETED | OUTPATIENT
Start: 2022-10-23 | End: 2022-10-23

## 2022-10-23 RX ORDER — MORPHINE SULFATE 4 MG/ML
4 INJECTION, SOLUTION INTRAMUSCULAR; INTRAVENOUS EVERY 30 MIN PRN
Status: DISCONTINUED | OUTPATIENT
Start: 2022-10-23 | End: 2022-10-24

## 2022-10-23 RX ORDER — ONDANSETRON 2 MG/ML
4 INJECTION INTRAMUSCULAR; INTRAVENOUS ONCE
Status: COMPLETED | OUTPATIENT
Start: 2022-10-23 | End: 2022-10-23

## 2022-10-23 NOTE — TELEPHONE ENCOUNTER
Pt's  called for morphine refill. It was refilled. Then he called again saying pt was having no relief from morphine, and reports that she is having progressive pain at where her surgical site is R sided head pain and was also vomiting. I told him to bring her to the emergency room to be evaluated, she will need repeat imaging of her head given history of GBM.

## 2022-10-24 LAB
ALBUMIN SERPL-MCNC: 4.3 G/DL (ref 3.4–5)
ALBUMIN/GLOB SERPL: 1.2 {RATIO} (ref 1–2)
ALP LIVER SERPL-CCNC: 78 U/L
ALT SERPL-CCNC: 43 U/L
ANION GAP SERPL CALC-SCNC: 9 MMOL/L (ref 0–18)
AST SERPL-CCNC: 15 U/L (ref 15–37)
BASOPHILS # BLD AUTO: 0.03 X10(3) UL (ref 0–0.2)
BASOPHILS NFR BLD AUTO: 0.3 %
BILIRUB SERPL-MCNC: 0.6 MG/DL (ref 0.1–2)
BILIRUB UR QL STRIP.AUTO: NEGATIVE
BUN BLD-MCNC: 10 MG/DL (ref 7–18)
CALCIUM BLD-MCNC: 9.4 MG/DL (ref 8.5–10.1)
CHLORIDE SERPL-SCNC: 105 MMOL/L (ref 98–112)
CO2 SERPL-SCNC: 24 MMOL/L (ref 21–32)
COLOR UR AUTO: YELLOW
CREAT BLD-MCNC: 0.79 MG/DL
EOSINOPHIL # BLD AUTO: 0 X10(3) UL (ref 0–0.7)
EOSINOPHIL NFR BLD AUTO: 0 %
ERYTHROCYTE [DISTWIDTH] IN BLOOD BY AUTOMATED COUNT: 21.9 %
GFR SERPLBLD BASED ON 1.73 SQ M-ARVRAT: 97 ML/MIN/1.73M2 (ref 60–?)
GLOBULIN PLAS-MCNC: 3.6 G/DL (ref 2.8–4.4)
GLUCOSE BLD-MCNC: 188 MG/DL (ref 70–99)
GLUCOSE UR STRIP.AUTO-MCNC: NEGATIVE MG/DL
HCT VFR BLD AUTO: 44.5 %
HGB BLD-MCNC: 14.2 G/DL
IMM GRANULOCYTES # BLD AUTO: 0.12 X10(3) UL (ref 0–1)
IMM GRANULOCYTES NFR BLD: 1.1 %
INR BLD: 0.94 (ref 0.85–1.16)
KETONES UR STRIP.AUTO-MCNC: NEGATIVE MG/DL
LEUKOCYTE ESTERASE UR QL STRIP.AUTO: NEGATIVE
LYMPHOCYTES # BLD AUTO: 0.32 X10(3) UL (ref 1–4)
LYMPHOCYTES NFR BLD AUTO: 2.9 %
MAGNESIUM SERPL-MCNC: 2.2 MG/DL (ref 1.6–2.6)
MCH RBC QN AUTO: 27.3 PG (ref 26–34)
MCHC RBC AUTO-ENTMCNC: 31.9 G/DL (ref 31–37)
MCV RBC AUTO: 85.6 FL
MONOCYTES # BLD AUTO: 0.11 X10(3) UL (ref 0.1–1)
MONOCYTES NFR BLD AUTO: 1 %
NEUTROPHILS # BLD AUTO: 10.27 X10 (3) UL (ref 1.5–7.7)
NEUTROPHILS # BLD AUTO: 10.27 X10(3) UL (ref 1.5–7.7)
NEUTROPHILS NFR BLD AUTO: 94.7 %
NITRITE UR QL STRIP.AUTO: NEGATIVE
OSMOLALITY SERPL CALC.SUM OF ELEC: 290 MOSM/KG (ref 275–295)
PH UR STRIP.AUTO: 7 [PH] (ref 5–8)
PLATELET # BLD AUTO: 292 10(3)UL (ref 150–450)
POTASSIUM SERPL-SCNC: 3.3 MMOL/L (ref 3.5–5.1)
PROT SERPL-MCNC: 7.9 G/DL (ref 6.4–8.2)
PROT UR STRIP.AUTO-MCNC: NEGATIVE MG/DL
PROTHROMBIN TIME: 12.5 SECONDS (ref 11.6–14.8)
RBC # BLD AUTO: 5.2 X10(6)UL
RBC UR QL AUTO: NEGATIVE
SODIUM SERPL-SCNC: 138 MMOL/L (ref 136–145)
SP GR UR STRIP.AUTO: 1.02 (ref 1–1.03)
UROBILINOGEN UR STRIP.AUTO-MCNC: <2 MG/DL
WBC # BLD AUTO: 10.9 X10(3) UL (ref 4–11)

## 2022-10-24 PROCEDURE — 99223 1ST HOSP IP/OBS HIGH 75: CPT | Performed by: CLINICAL NURSE SPECIALIST

## 2022-10-24 PROCEDURE — 99291 CRITICAL CARE FIRST HOUR: CPT | Performed by: OTHER

## 2022-10-24 PROCEDURE — 99221 1ST HOSP IP/OBS SF/LOW 40: CPT | Performed by: NEUROLOGICAL SURGERY

## 2022-10-24 PROCEDURE — 99291 CRITICAL CARE FIRST HOUR: CPT | Performed by: NURSE PRACTITIONER

## 2022-10-24 PROCEDURE — 99233 SBSQ HOSP IP/OBS HIGH 50: CPT | Performed by: INTERNAL MEDICINE

## 2022-10-24 PROCEDURE — 99254 IP/OBS CNSLTJ NEW/EST MOD 60: CPT | Performed by: NURSE PRACTITIONER

## 2022-10-24 RX ORDER — MELATONIN
3 NIGHTLY PRN
Status: DISCONTINUED | OUTPATIENT
Start: 2022-10-24 | End: 2022-10-25

## 2022-10-24 RX ORDER — ACETAMINOPHEN 500 MG
500 TABLET ORAL EVERY 4 HOURS PRN
Status: DISCONTINUED | OUTPATIENT
Start: 2022-10-24 | End: 2022-10-24

## 2022-10-24 RX ORDER — DEXAMETHASONE 4 MG/1
4 TABLET ORAL ONCE
Status: COMPLETED | OUTPATIENT
Start: 2022-10-24 | End: 2022-10-24

## 2022-10-24 RX ORDER — GABAPENTIN 300 MG/1
300 CAPSULE ORAL 3 TIMES DAILY
Status: DISCONTINUED | OUTPATIENT
Start: 2022-10-24 | End: 2022-10-24

## 2022-10-24 RX ORDER — HYDROMORPHONE HYDROCHLORIDE 1 MG/ML
0.8 INJECTION, SOLUTION INTRAMUSCULAR; INTRAVENOUS; SUBCUTANEOUS EVERY 2 HOUR PRN
Status: DISCONTINUED | OUTPATIENT
Start: 2022-10-24 | End: 2022-10-24

## 2022-10-24 RX ORDER — LABETALOL HYDROCHLORIDE 5 MG/ML
10 INJECTION, SOLUTION INTRAVENOUS EVERY 6 HOURS PRN
Status: DISCONTINUED | OUTPATIENT
Start: 2022-10-24 | End: 2022-10-25

## 2022-10-24 RX ORDER — HYDROMORPHONE HYDROCHLORIDE 1 MG/ML
0.2 INJECTION, SOLUTION INTRAMUSCULAR; INTRAVENOUS; SUBCUTANEOUS EVERY 2 HOUR PRN
Status: DISCONTINUED | OUTPATIENT
Start: 2022-10-24 | End: 2022-10-24

## 2022-10-24 RX ORDER — SODIUM CHLORIDE 9 MG/ML
INJECTION, SOLUTION INTRAVENOUS CONTINUOUS
Status: DISCONTINUED | OUTPATIENT
Start: 2022-10-24 | End: 2022-10-25

## 2022-10-24 RX ORDER — LEVETIRACETAM 500 MG/5ML
1250 INJECTION, SOLUTION, CONCENTRATE INTRAVENOUS EVERY 12 HOURS
Status: DISCONTINUED | OUTPATIENT
Start: 2022-10-24 | End: 2022-10-25

## 2022-10-24 RX ORDER — HYDROMORPHONE HYDROCHLORIDE 1 MG/ML
0.4 INJECTION, SOLUTION INTRAMUSCULAR; INTRAVENOUS; SUBCUTANEOUS EVERY 2 HOUR PRN
Status: DISCONTINUED | OUTPATIENT
Start: 2022-10-24 | End: 2022-10-24

## 2022-10-24 RX ORDER — LEVETIRACETAM 500 MG/5ML
250 INJECTION, SOLUTION, CONCENTRATE INTRAVENOUS EVERY 12 HOURS
Status: DISCONTINUED | OUTPATIENT
Start: 2022-10-24 | End: 2022-10-24

## 2022-10-24 RX ORDER — HYDROCODONE BITARTRATE AND ACETAMINOPHEN 5; 325 MG/1; MG/1
1 TABLET ORAL EVERY 4 HOURS PRN
Status: DISCONTINUED | OUTPATIENT
Start: 2022-10-24 | End: 2022-10-25

## 2022-10-24 RX ORDER — MORPHINE SULFATE 15 MG/1
15 TABLET ORAL EVERY 4 HOURS PRN
Status: DISCONTINUED | OUTPATIENT
Start: 2022-10-24 | End: 2022-10-24

## 2022-10-24 RX ORDER — ACETAMINOPHEN 500 MG
500 TABLET ORAL EVERY 4 HOURS PRN
Status: DISCONTINUED | OUTPATIENT
Start: 2022-10-24 | End: 2022-10-25

## 2022-10-24 RX ORDER — ENOXAPARIN SODIUM 100 MG/ML
40 INJECTION SUBCUTANEOUS NIGHTLY
Status: DISCONTINUED | OUTPATIENT
Start: 2022-10-24 | End: 2022-10-25

## 2022-10-24 RX ORDER — ONDANSETRON 2 MG/ML
4 INJECTION INTRAMUSCULAR; INTRAVENOUS EVERY 6 HOURS PRN
Status: DISCONTINUED | OUTPATIENT
Start: 2022-10-24 | End: 2022-10-25

## 2022-10-24 RX ORDER — DEXAMETHASONE SODIUM PHOSPHATE 4 MG/ML
4 VIAL (ML) INJECTION EVERY 6 HOURS
Status: DISCONTINUED | OUTPATIENT
Start: 2022-10-24 | End: 2022-10-25

## 2022-10-24 RX ORDER — HYDROCODONE BITARTRATE AND ACETAMINOPHEN 5; 325 MG/1; MG/1
1-2 TABLET ORAL EVERY 6 HOURS PRN
Status: DISCONTINUED | OUTPATIENT
Start: 2022-10-24 | End: 2022-10-24

## 2022-10-24 RX ORDER — ONDANSETRON 2 MG/ML
4 INJECTION INTRAMUSCULAR; INTRAVENOUS EVERY 6 HOURS PRN
Status: DISCONTINUED | OUTPATIENT
Start: 2022-10-24 | End: 2022-10-24

## 2022-10-24 RX ORDER — GABAPENTIN 400 MG/1
400 CAPSULE ORAL 3 TIMES DAILY
Status: DISCONTINUED | OUTPATIENT
Start: 2022-10-24 | End: 2022-10-25

## 2022-10-24 RX ORDER — DEXAMETHASONE SODIUM PHOSPHATE 4 MG/ML
8 VIAL (ML) INJECTION EVERY 12 HOURS
Status: DISCONTINUED | OUTPATIENT
Start: 2022-10-24 | End: 2022-10-24

## 2022-10-24 RX ORDER — PROCHLORPERAZINE EDISYLATE 5 MG/ML
5 INJECTION INTRAMUSCULAR; INTRAVENOUS EVERY 8 HOURS PRN
Status: DISCONTINUED | OUTPATIENT
Start: 2022-10-24 | End: 2022-10-25

## 2022-10-24 RX ORDER — ACETAMINOPHEN 10 MG/ML
1000 INJECTION, SOLUTION INTRAVENOUS EVERY 6 HOURS SCHEDULED
Status: DISCONTINUED | OUTPATIENT
Start: 2022-10-24 | End: 2022-10-24

## 2022-10-24 RX ORDER — PANTOPRAZOLE SODIUM 40 MG/1
40 TABLET, DELAYED RELEASE ORAL
Status: DISCONTINUED | OUTPATIENT
Start: 2022-10-24 | End: 2022-10-25

## 2022-10-24 RX ORDER — HYDRALAZINE HYDROCHLORIDE 20 MG/ML
10 INJECTION INTRAMUSCULAR; INTRAVENOUS
Status: DISCONTINUED | OUTPATIENT
Start: 2022-10-24 | End: 2022-10-25

## 2022-10-24 RX ORDER — MORPHINE SULFATE 4 MG/ML
4 INJECTION, SOLUTION INTRAMUSCULAR; INTRAVENOUS EVERY 2 HOUR PRN
Status: DISCONTINUED | OUTPATIENT
Start: 2022-10-24 | End: 2022-10-25

## 2022-10-24 RX ORDER — HYDROCODONE BITARTRATE AND ACETAMINOPHEN 10; 325 MG/1; MG/1
1 TABLET ORAL EVERY 4 HOURS PRN
Status: DISCONTINUED | OUTPATIENT
Start: 2022-10-24 | End: 2022-10-24

## 2022-10-24 RX ORDER — MORPHINE SULFATE 15 MG/1
15 TABLET ORAL EVERY 4 HOURS PRN
Status: DISCONTINUED | OUTPATIENT
Start: 2022-10-24 | End: 2022-10-25

## 2022-10-24 NOTE — ED INITIAL ASSESSMENT (HPI)
Headache to right side x 5 days, h/o brain surgery in May for a tumor removal, pain worse tonight. EMS noted elevated BP reading tonight. Pt Parr speaking.   Pt states had a CT scan on 10/18, last chemo yesterday

## 2022-10-24 NOTE — PLAN OF CARE
Assumed care at 1600.  A&O x 4, Tele-NSR  [facility], No new neuro deficits, See flowsheet  oriented to room and surroundings  Per Dr. Eugene Gilliland, asked for  to bring chemo medications and possible dc tomorrow  Will continue to monitor

## 2022-10-24 NOTE — PLAN OF CARE
Patient care assumed 0000 from ED, Neurologically intact except for baseline L sided weakness, decreased sensation, and L facial droop. Face is swollen from steroid use. Parr speaking but able to communicate in [address] well enough for exam. Frequently urinating, stand and pivot with one heavy assist. Sinus tach on monitor, normotensive, maintain SBP < 645 though diastolic BP has been high with constant movement.

## 2022-10-24 NOTE — ED QUICK NOTES
Orders for admission, patient is aware of plan and ready to go upstairs. Any questions, please call ED RN Radhika Vogt at extension [phone].      Patient Covid vaccination status: Fully vaccinated     COVID Test Ordered in ED: Rapid SARS-CoV-2 by PCR    COVID Suspicion at Admission: N/A    Running Infusions:  None    Mental Status/LOC at time of transport: A & O X 3    Other pertinent information:   CIWA score: N/A   NIH score:  N/A

## 2022-10-24 NOTE — PLAN OF CARE
Assumed patient care at 0730. Patient resting in bed, seizure precautions.  at bedside to help facilitate neuro checks - patient oriented X3-X4 when using service and willing to participate in exam. L sided weakness, however able to lift and squeeze on command, using R hand to help with moving LUE and LLE. Noting some slight L decreased sensation? However when asking with  pt does state that she does not feel decreased sensation. Slight L facial droop noted. Patient drowsy at certain points of the morning. R neck site itchy, irritated, lidocaine patch placed. Urinary urgency this morning, NeuroCC updated. HR elevated as well. 1L bolus given. UA sent. IV access to bedside to place extended IV. Transfer orders, report given to receiving RN. Spouse updated on room number change. Problem: NEUROLOGICAL - ADULT  Goal: Achieves stable or improved neurological status  Description: INTERVENTIONS  - Assess for and report changes in neurological status  - Initiate measures to prevent increased intracranial pressure  - Maintain blood pressure and fluid volume within ordered parameters to optimize cerebral perfusion and minimize risk of hemorrhage  - Monitor temperature, glucose, and sodium.  Initiate appropriate interventions as ordered  Outcome: Progressing     Problem: NEUROLOGICAL - ADULT  Goal: Absence of seizures  Description: INTERVENTIONS  - Monitor for seizure activity  - Administer anti-seizure medications as ordered  - Monitor neurological status  Outcome: Progressing

## 2022-10-25 VITALS
WEIGHT: 142 LBS | SYSTOLIC BLOOD PRESSURE: 136 MMHG | BODY MASS INDEX: 27 KG/M2 | DIASTOLIC BLOOD PRESSURE: 91 MMHG | HEART RATE: 80 BPM | OXYGEN SATURATION: 95 % | RESPIRATION RATE: 18 BRPM | TEMPERATURE: 99 F

## 2022-10-25 LAB
INR BLD: 0.96 (ref 0.85–1.16)
PHOSPHATE SERPL-MCNC: 3.7 MG/DL (ref 2.5–4.9)
PROTHROMBIN TIME: 12.8 SECONDS (ref 11.6–14.8)

## 2022-10-25 PROCEDURE — 99239 HOSP IP/OBS DSCHRG MGMT >30: CPT | Performed by: INTERNAL MEDICINE

## 2022-10-25 PROCEDURE — 99233 SBSQ HOSP IP/OBS HIGH 50: CPT | Performed by: CLINICAL NURSE SPECIALIST

## 2022-10-25 PROCEDURE — 99231 SBSQ HOSP IP/OBS SF/LOW 25: CPT | Performed by: NEUROLOGICAL SURGERY

## 2022-10-25 RX ORDER — DEXAMETHASONE 4 MG/1
8 TABLET ORAL 2 TIMES DAILY
Qty: 12 TABLET | Refills: 0 | Status: SHIPPED | OUTPATIENT
Start: 2022-10-25 | End: 2022-10-25

## 2022-10-25 RX ORDER — GABAPENTIN 300 MG/1
600 CAPSULE ORAL 3 TIMES DAILY
Qty: 180 CAPSULE | Refills: 0 | Status: SHIPPED | OUTPATIENT
Start: 2022-10-25

## 2022-10-25 RX ORDER — DEXAMETHASONE 2 MG/1
4 TABLET ORAL 2 TIMES DAILY WITH MEALS
Qty: 56 TABLET | Refills: 0 | Status: SHIPPED | OUTPATIENT
Start: 2022-10-25 | End: 2022-10-25

## 2022-10-25 RX ORDER — DEXAMETHASONE 2 MG/1
4 TABLET ORAL 2 TIMES DAILY WITH MEALS
Qty: 56 TABLET | Refills: 0 | Status: SHIPPED | OUTPATIENT
Start: 2022-10-25 | End: 2022-11-08

## 2022-10-25 NOTE — PLAN OF CARE
Assumed care ay 0700, A&O x 4, Tele-NSR  Pt walked the halls with CTU 7 staff  No neuro changes noted    Prescriptions, medications,and follow up instructions reviewed pt  Pt verbalizes understanding

## 2022-10-25 NOTE — CM/SW NOTE
Pt discussed in rounds this AM, expected to dc today. SW familiar with pt/services. Pt current with [facility] and [facility]. Updates/orders sent in Aidin. Info listed on AVS. Aware of dc today. Providers to reach out to pt for [facility].      ECTOR Woods  Discharge [facility]
Past 24 hrs

## 2022-10-25 NOTE — DISCHARGE INSTRUCTIONS
[address] Phone: [phone] Fax: [phone]. [address] [place] [address]. Continue dexamethasone at home, 4 mg in the morning and 4 mg at night. Please complete full 5 days of temodar as discussed with your  on the phone.

## 2022-10-25 NOTE — PLAN OF CARE
Received pt in bed, alert and oriented with intermittent confusion, no complains of headache or discomfort, RA,NSR on tele, neuro's with no new deficit chemo drugs brought in and administered by , slept throughout the night, call light and all necessary item reach. Fall precaution maintained. Problem: Patient/Family Goals  Goal: Patient/Family Long Term Goal  Description: Patient's Long Term Goal:     Interventions:  -   - See additional Care Plan goals for specific interventions  Outcome: Progressing  Goal: Patient/Family Short Term Goal  Description: Patient's Short Term Goal:     Interventions:   -   - See additional Care Plan goals for specific interventions  Outcome: Progressing     Problem: NEUROLOGICAL - ADULT  Goal: Achieves stable or improved neurological status  Description: INTERVENTIONS  - Assess for and report changes in neurological status  - Initiate measures to prevent increased intracranial pressure  - Maintain blood pressure and fluid volume within ordered parameters to optimize cerebral perfusion and minimize risk of hemorrhage  - Monitor temperature, glucose, and sodium.  Initiate appropriate interventions as ordered  Outcome: Progressing  Goal: Absence of seizures  Description: INTERVENTIONS  - Monitor for seizure activity  - Administer anti-seizure medications as ordered  - Monitor neurological status  Outcome: Progressing  Goal: Remains free of injury related to seizure activity  Description: INTERVENTIONS:  - Maintain airway, patient safety  and administer oxygen as ordered  - Monitor patient for seizure activity, document and report duration and description of seizure to MD/LIP  - If seizure occurs, turn patient to side and suction secretions as needed  - Reorient patient post seizure  - Seizure pads on all 4 side rails  - Instruct patient/family to notify RN of any seizure activity  - Instruct patient/family to call for assistance with activity based on assessment  Outcome: Progressing  Goal: Achieves maximal functionality and self care  Description: INTERVENTIONS  - Monitor swallowing and airway patency with patient fatigue and changes in neurological status  - Encourage and assist patient to increase activity and self care with guidance from PT/OT  - Encourage visually impaired, hearing impaired and aphasic patients to use assistive/communication devices  Outcome: Progressing

## 2022-11-04 ENCOUNTER — OFFICE VISIT (OUTPATIENT)
Dept: HEMATOLOGY/ONCOLOGY | Facility: HOSPITAL | Age: 42
End: 2022-11-04
Attending: INTERNAL MEDICINE
Payer: OTHER GOVERNMENT

## 2022-11-04 ENCOUNTER — SOCIAL WORK SERVICES (OUTPATIENT)
Dept: HEMATOLOGY/ONCOLOGY | Facility: HOSPITAL | Age: 42
End: 2022-11-04

## 2022-11-04 VITALS — BODY MASS INDEX: 28 KG/M2 | WEIGHT: 146.81 LBS | SYSTOLIC BLOOD PRESSURE: 163 MMHG | DIASTOLIC BLOOD PRESSURE: 107 MMHG

## 2022-11-04 VITALS
TEMPERATURE: 98 F | RESPIRATION RATE: 16 BRPM | OXYGEN SATURATION: 95 % | HEART RATE: 64 BPM | DIASTOLIC BLOOD PRESSURE: 108 MMHG | SYSTOLIC BLOOD PRESSURE: 160 MMHG

## 2022-11-04 DIAGNOSIS — C71.9 GBM (GLIOBLASTOMA MULTIFORME) (HCC): Primary | ICD-10-CM

## 2022-11-04 LAB
ALBUMIN SERPL-MCNC: 3.1 G/DL (ref 3.4–5)
ALBUMIN/GLOB SERPL: 1 {RATIO} (ref 1–2)
ALP LIVER SERPL-CCNC: 59 U/L
ALT SERPL-CCNC: 46 U/L
ANION GAP SERPL CALC-SCNC: 7 MMOL/L (ref 0–18)
AST SERPL-CCNC: 21 U/L (ref 15–37)
BASOPHILS # BLD AUTO: 0.04 X10(3) UL (ref 0–0.2)
BASOPHILS NFR BLD AUTO: 0.5 %
BILIRUB SERPL-MCNC: 0.4 MG/DL (ref 0.1–2)
BILIRUB UR QL STRIP.AUTO: NEGATIVE
BUN BLD-MCNC: 23 MG/DL (ref 7–18)
CALCIUM BLD-MCNC: 8.7 MG/DL (ref 8.5–10.1)
CHLORIDE SERPL-SCNC: 107 MMOL/L (ref 98–112)
CO2 SERPL-SCNC: 24 MMOL/L (ref 21–32)
COLOR UR AUTO: YELLOW
CREAT BLD-MCNC: 0.64 MG/DL
EOSINOPHIL # BLD AUTO: 0.02 X10(3) UL (ref 0–0.7)
EOSINOPHIL NFR BLD AUTO: 0.2 %
ERYTHROCYTE [DISTWIDTH] IN BLOOD BY AUTOMATED COUNT: 21.9 %
GFR SERPLBLD BASED ON 1.73 SQ M-ARVRAT: 115 ML/MIN/1.73M2 (ref 60–?)
GLOBULIN PLAS-MCNC: 3.1 G/DL (ref 2.8–4.4)
GLUCOSE BLD-MCNC: 123 MG/DL (ref 70–99)
GLUCOSE UR STRIP.AUTO-MCNC: NEGATIVE MG/DL
HCT VFR BLD AUTO: 39.2 %
HGB BLD-MCNC: 12.8 G/DL
IMM GRANULOCYTES # BLD AUTO: 0.42 X10(3) UL (ref 0–1)
IMM GRANULOCYTES NFR BLD: 5 %
KETONES UR STRIP.AUTO-MCNC: NEGATIVE MG/DL
LEUKOCYTE ESTERASE UR QL STRIP.AUTO: NEGATIVE
LYMPHOCYTES # BLD AUTO: 0.68 X10(3) UL (ref 1–4)
LYMPHOCYTES NFR BLD AUTO: 8.2 %
MCH RBC QN AUTO: 27.9 PG (ref 26–34)
MCHC RBC AUTO-ENTMCNC: 32.7 G/DL (ref 31–37)
MCV RBC AUTO: 85.4 FL
MONOCYTES # BLD AUTO: 0.37 X10(3) UL (ref 0.1–1)
MONOCYTES NFR BLD AUTO: 4.4 %
NEUTROPHILS # BLD AUTO: 6.8 X10 (3) UL (ref 1.5–7.7)
NEUTROPHILS # BLD AUTO: 6.8 X10(3) UL (ref 1.5–7.7)
NEUTROPHILS NFR BLD AUTO: 81.7 %
NITRITE UR QL STRIP.AUTO: NEGATIVE
OSMOLALITY SERPL CALC.SUM OF ELEC: 291 MOSM/KG (ref 275–295)
PH UR STRIP.AUTO: 5 [PH] (ref 5–8)
PLATELET MORPHOLOGY: NORMAL
POTASSIUM SERPL-SCNC: 3.8 MMOL/L (ref 3.5–5.1)
PROT SERPL-MCNC: 6.2 G/DL (ref 6.4–8.2)
PROT UR STRIP.AUTO-MCNC: NEGATIVE MG/DL
RBC # BLD AUTO: 4.59 X10(6)UL
RBC UR QL AUTO: NEGATIVE
SODIUM SERPL-SCNC: 138 MMOL/L (ref 136–145)
SP GR UR STRIP.AUTO: 1.02 (ref 1–1.03)
UROBILINOGEN UR STRIP.AUTO-MCNC: <2 MG/DL
WBC # BLD AUTO: 8.3 X10(3) UL (ref 4–11)

## 2022-11-04 PROCEDURE — 96413 CHEMO IV INFUSION 1 HR: CPT

## 2022-11-04 PROCEDURE — 99215 OFFICE O/P EST HI 40 MIN: CPT | Performed by: INTERNAL MEDICINE

## 2022-11-04 RX ORDER — TEMOZOLOMIDE 140 MG/1
140 CAPSULE ORAL DAILY
Qty: 5 CAPSULE | Refills: 0 | Status: SHIPPED | OUTPATIENT
Start: 2022-11-18

## 2022-11-04 RX ORDER — LISINOPRIL 20 MG/1
20 TABLET ORAL DAILY
Qty: 30 TABLET | Refills: 5 | Status: SHIPPED | OUTPATIENT
Start: 2022-11-04

## 2022-11-04 RX ORDER — TEMOZOLOMIDE 100 MG/1
100 CAPSULE ORAL DAILY
Qty: 5 CAPSULE | Refills: 3 | Status: SHIPPED | OUTPATIENT
Start: 2022-11-18

## 2022-11-04 NOTE — PROGRESS NOTES
CORI contacted pt's , Dedra Diaz [phone] who had a requested information about FMLA. Fabricio Juli states that he has used all of his FMLA (12 weeks) at this time. He obtained a letter while pt was recently inpatient recommended his leave be extended. He states that he has plans to turn this into his employer. He confirms that he has been given caregiver information and states that he has no further needs at this time. He is hopeful his employer is supportive of additional leave as needed to care for his wife. CORI offered support and encouraged pt to call with any additional needs. SW encouraged him to request any additional information for his employer so that he has the correct information to provide. Fabricio Juli will contact CORI for any additional needs. ANNABEL YoungW   at [facility]    [address] [facility], [address] [place] [address], Tia, [address] [place] [address] [facility], [facility]ariel Kong  Ph: [phone]. [email or web address]  Fax: [phone]

## 2022-11-04 NOTE — PROGRESS NOTES
Pt here for C1D1 MVASI. Arrives Via wheelchair, accompanied by Spouse           Pregnancy screening: Denies possibility of pregnancy    Modifications in dose or schedule: No    Drugs/infusions dual verified for appearance and physical integrity. IV pump settings were dual verified: yes     Frequency of blood return and site check throughout administration: Prior to administration and At completion of therapy   Discharged to Home, Via wheelchair, accompanied by:Spouse    Outpatient Oncology Care Plan  Problem list:  knowledge deficit  Problems related to:  disease  Interventions:  provided general teaching  Expected outcomes:  safe in environment  understands plan of care  Progress towards outcome:  making progress    Education Record    Learner:  Patient and Spouse  Barriers / Limitations:  None  Method:  Brief focused and Reinforcement  Outcome:  Shows understanding  Comments:   aware that patient needs to start Lisinopril tonight. Prescription e-prescribed by Dr. Mehnaz Alfredo to their pharmacy. Spouse verbalized understanding.

## 2022-11-07 ENCOUNTER — TELEPHONE (OUTPATIENT)
Dept: HEMATOLOGY/ONCOLOGY | Facility: HOSPITAL | Age: 42
End: 2022-11-07

## 2022-11-07 ENCOUNTER — HOSPITAL ENCOUNTER (OUTPATIENT)
Dept: MRI IMAGING | Facility: HOSPITAL | Age: 42
Discharge: HOME | End: 2022-11-07
Attending: CLINICAL NURSE SPECIALIST
Payer: OTHER GOVERNMENT

## 2022-11-07 DIAGNOSIS — C71.9 GBM (GLIOBLASTOMA MULTIFORME) (HCC): ICD-10-CM

## 2022-11-07 PROCEDURE — A9575 INJ GADOTERATE MEGLUMI 0.1ML: HCPCS

## 2022-11-07 PROCEDURE — 70553 MRI BRAIN STEM W/O & W/DYE: CPT | Performed by: CLINICAL NURSE SPECIALIST

## 2022-11-07 RX ORDER — GADOTERATE MEGLUMINE 376.9 MG/ML
20 INJECTION INTRAVENOUS
Status: COMPLETED | OUTPATIENT
Start: 2022-11-07 | End: 2022-11-07

## 2022-11-07 RX ADMIN — GADOTERATE MEGLUMINE 20 ML: 376.9 INJECTION INTRAVENOUS at 12:15:00

## 2022-11-07 NOTE — TELEPHONE ENCOUNTER
Toxicities: C1 D1 Bevacizumab-awwb on 11/4/2022    Eliseo Alejo reports that Santiago Page is feeling \"good. \" No side effects at this time. She is eating & drinking. I encouraged him to please call the office if she is not feeling well or they have any questions or concerns. He agreed and thanked me for checking on her.

## 2022-11-09 ENCOUNTER — TELEPHONE (OUTPATIENT)
Dept: HEMATOLOGY/ONCOLOGY | Facility: HOSPITAL | Age: 42
End: 2022-11-09

## 2022-11-09 NOTE — TELEPHONE ENCOUNTER
Called spouse with information that Riddhi next cycle will start after she see's Nataly on 11/18. He verbalizes understanding.

## 2022-11-09 NOTE — TELEPHONE ENCOUNTER
11/4/22 - C1D1 -Bevacizumab    Returned call to spouse and patient, has been chilly for couple of days, no fevers, denies sob or chest pain, denies n/v/d, appetite fair-instructed to push fluids, small frequent meals. Denies lightheadedness or dizziness. No urinary or bowel complaints. Instructed to monitor for fever (no Tylenol prior to calling if any fevers)    Also they received Temozolmide today and are looking for instruction on when to start taking it. Requesting a call back from MD RN regarding this.

## 2022-11-09 NOTE — TELEPHONE ENCOUNTER
Patient  is calling because he states she started having chills yesterday and nothing is helping to warm him up. I asked if she was experiencing any other symptoms he stated just chills and she keeps asking for blankets.  Call back number is [phone]

## 2022-11-18 ENCOUNTER — OFFICE VISIT (OUTPATIENT)
Dept: HEMATOLOGY/ONCOLOGY | Facility: HOSPITAL | Age: 42
End: 2022-11-18
Attending: INTERNAL MEDICINE
Payer: OTHER GOVERNMENT

## 2022-11-18 VITALS
DIASTOLIC BLOOD PRESSURE: 93 MMHG | RESPIRATION RATE: 18 BRPM | SYSTOLIC BLOOD PRESSURE: 129 MMHG | WEIGHT: 145.19 LBS | BODY MASS INDEX: 27 KG/M2 | OXYGEN SATURATION: 97 % | HEART RATE: 107 BPM | TEMPERATURE: 100 F

## 2022-11-18 DIAGNOSIS — G81.94 LEFT HEMIPARESIS (HCC): ICD-10-CM

## 2022-11-18 DIAGNOSIS — K59.01 SLOW TRANSIT CONSTIPATION: ICD-10-CM

## 2022-11-18 DIAGNOSIS — Z71.89 COUNSELING REGARDING END OF LIFE DECISION MAKING: ICD-10-CM

## 2022-11-18 DIAGNOSIS — C71.9 GBM (GLIOBLASTOMA MULTIFORME) (HCC): Primary | ICD-10-CM

## 2022-11-18 DIAGNOSIS — F32.89 OTHER DEPRESSION: ICD-10-CM

## 2022-11-18 LAB
ALBUMIN SERPL-MCNC: 3.4 G/DL (ref 3.4–5)
ALBUMIN/GLOB SERPL: 1.2 {RATIO} (ref 1–2)
ALP LIVER SERPL-CCNC: 73 U/L
ALT SERPL-CCNC: 51 U/L
ANION GAP SERPL CALC-SCNC: 5 MMOL/L (ref 0–18)
AST SERPL-CCNC: 15 U/L (ref 15–37)
BASOPHILS # BLD AUTO: 0.02 X10(3) UL (ref 0–0.2)
BASOPHILS NFR BLD AUTO: 0.4 %
BILIRUB SERPL-MCNC: 0.3 MG/DL (ref 0.1–2)
BUN BLD-MCNC: 15 MG/DL (ref 7–18)
CALCIUM BLD-MCNC: 8.8 MG/DL (ref 8.5–10.1)
CHLORIDE SERPL-SCNC: 111 MMOL/L (ref 98–112)
CO2 SERPL-SCNC: 25 MMOL/L (ref 21–32)
CREAT BLD-MCNC: 0.51 MG/DL
EOSINOPHIL # BLD AUTO: 0.07 X10(3) UL (ref 0–0.7)
EOSINOPHIL NFR BLD AUTO: 1.5 %
ERYTHROCYTE [DISTWIDTH] IN BLOOD BY AUTOMATED COUNT: 19.9 %
GFR SERPLBLD BASED ON 1.73 SQ M-ARVRAT: 121 ML/MIN/1.73M2 (ref 60–?)
GLOBULIN PLAS-MCNC: 2.8 G/DL (ref 2.8–4.4)
GLUCOSE BLD-MCNC: 176 MG/DL (ref 70–99)
HCT VFR BLD AUTO: 40.3 %
HGB BLD-MCNC: 13.3 G/DL
IMM GRANULOCYTES # BLD AUTO: 0.1 X10(3) UL (ref 0–1)
IMM GRANULOCYTES NFR BLD: 2.1 %
LYMPHOCYTES # BLD AUTO: 0.87 X10(3) UL (ref 1–4)
LYMPHOCYTES NFR BLD AUTO: 18.4 %
MCH RBC QN AUTO: 28.6 PG (ref 26–34)
MCHC RBC AUTO-ENTMCNC: 33 G/DL (ref 31–37)
MCV RBC AUTO: 86.7 FL
MONOCYTES # BLD AUTO: 0.4 X10(3) UL (ref 0.1–1)
MONOCYTES NFR BLD AUTO: 8.4 %
NEUTROPHILS # BLD AUTO: 3.28 X10 (3) UL (ref 1.5–7.7)
NEUTROPHILS # BLD AUTO: 3.28 X10(3) UL (ref 1.5–7.7)
NEUTROPHILS NFR BLD AUTO: 69.2 %
OSMOLALITY SERPL CALC.SUM OF ELEC: 297 MOSM/KG (ref 275–295)
PLATELET # BLD AUTO: 204 10(3)UL (ref 150–450)
POTASSIUM SERPL-SCNC: 3.3 MMOL/L (ref 3.5–5.1)
PROT SERPL-MCNC: 6.2 G/DL (ref 6.4–8.2)
RBC # BLD AUTO: 4.65 X10(6)UL
SODIUM SERPL-SCNC: 141 MMOL/L (ref 136–145)
WBC # BLD AUTO: 4.7 X10(3) UL (ref 4–11)

## 2022-11-18 PROCEDURE — 36415 COLL VENOUS BLD VENIPUNCTURE: CPT

## 2022-11-18 PROCEDURE — 96413 CHEMO IV INFUSION 1 HR: CPT

## 2022-11-18 PROCEDURE — 80053 COMPREHEN METABOLIC PANEL: CPT

## 2022-11-18 RX ORDER — ALPRAZOLAM 0.25 MG/1
TABLET ORAL
COMMUNITY
Start: 2022-11-01

## 2022-11-18 RX ORDER — MIRTAZAPINE 15 MG/1
TABLET, FILM COATED ORAL
COMMUNITY
Start: 2022-11-15

## 2022-11-18 RX ORDER — METOCLOPRAMIDE 10 MG/1
10 TABLET ORAL 4 TIMES DAILY PRN
Qty: 60 TABLET | Refills: 3 | Status: SHIPPED | OUTPATIENT
Start: 2022-11-18

## 2022-11-18 RX ORDER — ESCITALOPRAM OXALATE 10 MG/1
10 TABLET ORAL DAILY
Qty: 30 TABLET | Refills: 1 | Status: SHIPPED | OUTPATIENT
Start: 2022-11-18

## 2022-11-18 NOTE — PROGRESS NOTES
Patient presents with: Follow - Up: APN assessment prior to treatment  Chemotherapy    Pt is here for treatment - C2 D1 MVASI is expected. Pt spouse assisting with translation; he reports that she is about the same as last visit. Continued light appetite, with early satiety; drinks fluids without issue. Denies N,V,C. Pt states she has had loose stools daily. Nerve pain continues in left leg (in a brace).     Education Record    Learner:  Patient and Spouse    Disease / Diagnosis: GBM    Barriers / Limitations:  Cognitive limitations, Communication barrier and Language   Comments:    Method:  Brief focused and  utilized   Comments:    General Topics:  Diet, Medication, Pain, Side effects and symptom management and Plan of care reviewed   Comments:    Outcome:  Needs reinforcement   Comments:

## 2022-11-22 ENCOUNTER — APPOINTMENT (OUTPATIENT)
Dept: HEMATOLOGY/ONCOLOGY | Facility: HOSPITAL | Age: 42
End: 2022-11-22
Attending: INTERNAL MEDICINE
Payer: OTHER GOVERNMENT

## 2022-11-28 ENCOUNTER — TELEPHONE (OUTPATIENT)
Dept: HEMATOLOGY/ONCOLOGY | Facility: HOSPITAL | Age: 42
End: 2022-11-28

## 2022-11-28 RX ORDER — VALACYCLOVIR HYDROCHLORIDE 1 G/1
1000 TABLET, FILM COATED ORAL 3 TIMES DAILY
Qty: 21 TABLET | Refills: 0 | Status: SHIPPED | OUTPATIENT
Start: 2022-11-28 | End: 2022-12-05

## 2022-11-28 NOTE — TELEPHONE ENCOUNTER
Called spouse that Dr Helga Henley sent script to pharmacy,  He verbalizes understanding. Problem: Inadequate oral food/beverage intake (NI-2.1)  Goal: Food and/or Nutrient Delivery  Description: Individualized approach for food/nutrient provision.   Outcome: Met This Shift

## 2022-11-28 NOTE — TELEPHONE ENCOUNTER
11/18/22 - C2D1 - Bevacizumab(Mvasi)    Spouse called stating patient has return of Shingles to right ear,(same area as previously) below ear to neck. Area itchy, red, peeling, drainage/blood from her itching area. Also had H/A starting last night. He gave her a Norco.      Denies fevers, no sob or chest pain, eating and drinking but decreased from taste changes, no other complaints per patient. Please advise. Spouse requesting a call back with instructions.

## 2022-11-28 NOTE — TELEPHONE ENCOUNTER
Patient's  called. She has shingles behind her ear and on her neck. It itches and hurts. It started last night with a headache. Please call. Thank you.

## 2022-11-29 ENCOUNTER — APPOINTMENT (OUTPATIENT)
Dept: HEMATOLOGY/ONCOLOGY | Facility: HOSPITAL | Age: 42
End: 2022-11-29
Attending: INTERNAL MEDICINE
Payer: OTHER GOVERNMENT

## 2022-12-02 ENCOUNTER — HOSPITAL ENCOUNTER (OUTPATIENT)
Facility: HOSPITAL | Age: 42
Setting detail: OBSERVATION
Discharge: HOME | End: 2022-12-03
Attending: EMERGENCY MEDICINE | Admitting: HOSPITALIST
Payer: OTHER GOVERNMENT

## 2022-12-02 ENCOUNTER — APPOINTMENT (OUTPATIENT)
Dept: CT IMAGING | Facility: HOSPITAL | Age: 42
End: 2022-12-02
Attending: EMERGENCY MEDICINE
Payer: OTHER GOVERNMENT

## 2022-12-02 ENCOUNTER — OFFICE VISIT (OUTPATIENT)
Dept: HEMATOLOGY/ONCOLOGY | Facility: HOSPITAL | Age: 42
End: 2022-12-02
Attending: INTERNAL MEDICINE
Payer: OTHER GOVERNMENT

## 2022-12-02 ENCOUNTER — SOCIAL WORK SERVICES (OUTPATIENT)
Dept: HEMATOLOGY/ONCOLOGY | Facility: HOSPITAL | Age: 42
End: 2022-12-02

## 2022-12-02 DIAGNOSIS — C71.9 GBM (GLIOBLASTOMA MULTIFORME) (HCC): ICD-10-CM

## 2022-12-02 DIAGNOSIS — R06.02 SHORTNESS OF BREATH: Primary | ICD-10-CM

## 2022-12-02 LAB
ALBUMIN SERPL-MCNC: 3.6 G/DL (ref 3.4–5)
ALBUMIN/GLOB SERPL: 1.1 {RATIO} (ref 1–2)
ALP LIVER SERPL-CCNC: 64 U/L
ALT SERPL-CCNC: 45 U/L
ANION GAP SERPL CALC-SCNC: 3 MMOL/L (ref 0–18)
AST SERPL-CCNC: 26 U/L (ref 15–37)
ATRIAL RATE: 106 BPM
BASOPHILS # BLD AUTO: 0.01 X10(3) UL (ref 0–0.2)
BASOPHILS NFR BLD AUTO: 0.2 %
BILIRUB SERPL-MCNC: 0.4 MG/DL (ref 0.1–2)
BUN BLD-MCNC: 13 MG/DL (ref 7–18)
CALCIUM BLD-MCNC: 9.6 MG/DL (ref 8.5–10.1)
CHLORIDE SERPL-SCNC: 110 MMOL/L (ref 98–112)
CO2 SERPL-SCNC: 28 MMOL/L (ref 21–32)
CREAT BLD-MCNC: 0.55 MG/DL
EOSINOPHIL # BLD AUTO: 0.04 X10(3) UL (ref 0–0.7)
EOSINOPHIL NFR BLD AUTO: 0.8 %
ERYTHROCYTE [DISTWIDTH] IN BLOOD BY AUTOMATED COUNT: 19.2 %
GFR SERPLBLD BASED ON 1.73 SQ M-ARVRAT: 119 ML/MIN/1.73M2 (ref 60–?)
GLOBULIN PLAS-MCNC: 3.2 G/DL (ref 2.8–4.4)
GLUCOSE BLD-MCNC: 112 MG/DL (ref 70–99)
HCT VFR BLD AUTO: 42.2 %
HGB BLD-MCNC: 14.2 G/DL
IMM GRANULOCYTES # BLD AUTO: 0.02 X10(3) UL (ref 0–1)
IMM GRANULOCYTES NFR BLD: 0.4 %
LYMPHOCYTES # BLD AUTO: 1.02 X10(3) UL (ref 1–4)
LYMPHOCYTES NFR BLD AUTO: 21.7 %
MCH RBC QN AUTO: 30 PG (ref 26–34)
MCHC RBC AUTO-ENTMCNC: 33.6 G/DL (ref 31–37)
MCV RBC AUTO: 89.2 FL
MONOCYTES # BLD AUTO: 0.44 X10(3) UL (ref 0.1–1)
MONOCYTES NFR BLD AUTO: 9.3 %
NEUTROPHILS # BLD AUTO: 3.18 X10 (3) UL (ref 1.5–7.7)
NEUTROPHILS # BLD AUTO: 3.18 X10(3) UL (ref 1.5–7.7)
NEUTROPHILS NFR BLD AUTO: 67.6 %
NT-PROBNP SERPL-MCNC: 12 PG/ML (ref ?–125)
OSMOLALITY SERPL CALC.SUM OF ELEC: 293 MOSM/KG (ref 275–295)
P AXIS: 22 DEGREES
P-R INTERVAL: 150 MS
PLATELET # BLD AUTO: 328 10(3)UL (ref 150–450)
POTASSIUM SERPL-SCNC: 4.1 MMOL/L (ref 3.5–5.1)
PROT SERPL-MCNC: 6.8 G/DL (ref 6.4–8.2)
Q-T INTERVAL: 356 MS
QRS DURATION: 88 MS
QTC CALCULATION (BEZET): 472 MS
R AXIS: -52 DEGREES
RBC # BLD AUTO: 4.73 X10(6)UL
SARS-COV-2 RNA RESP QL NAA+PROBE: NOT DETECTED
SODIUM SERPL-SCNC: 141 MMOL/L (ref 136–145)
T AXIS: 0 DEGREES
TROPONIN I HIGH SENSITIVITY: 6 NG/L
VENTRICULAR RATE: 106 BPM
WBC # BLD AUTO: 4.7 X10(3) UL (ref 4–11)

## 2022-12-02 PROCEDURE — 71275 CT ANGIOGRAPHY CHEST: CPT | Performed by: EMERGENCY MEDICINE

## 2022-12-02 PROCEDURE — 99244 OFF/OP CNSLTJ NEW/EST MOD 40: CPT | Performed by: INTERNAL MEDICINE

## 2022-12-02 PROCEDURE — 99220 INITIAL OBSERVATION CARE,LEVL III: CPT | Performed by: HOSPITALIST

## 2022-12-02 PROCEDURE — 99215 OFFICE O/P EST HI 40 MIN: CPT | Performed by: INTERNAL MEDICINE

## 2022-12-02 PROCEDURE — 70450 CT HEAD/BRAIN W/O DYE: CPT | Performed by: EMERGENCY MEDICINE

## 2022-12-02 RX ORDER — ESCITALOPRAM OXALATE 10 MG/1
10 TABLET ORAL DAILY
Status: DISCONTINUED | OUTPATIENT
Start: 2022-12-03 | End: 2022-12-03

## 2022-12-02 RX ORDER — PANTOPRAZOLE SODIUM 40 MG/1
40 TABLET, DELAYED RELEASE ORAL
Status: DISCONTINUED | OUTPATIENT
Start: 2022-12-03 | End: 2022-12-03

## 2022-12-02 RX ORDER — ACETAMINOPHEN 500 MG
500 TABLET ORAL EVERY 4 HOURS PRN
Status: DISCONTINUED | OUTPATIENT
Start: 2022-12-02 | End: 2022-12-03

## 2022-12-02 RX ORDER — MIRTAZAPINE 15 MG/1
15 TABLET, FILM COATED ORAL NIGHTLY
Status: DISCONTINUED | OUTPATIENT
Start: 2022-12-02 | End: 2022-12-03

## 2022-12-02 RX ORDER — MORPHINE SULFATE 15 MG/1
15 TABLET ORAL EVERY 4 HOURS PRN
Status: DISCONTINUED | OUTPATIENT
Start: 2022-12-02 | End: 2022-12-03

## 2022-12-02 RX ORDER — LISINOPRIL 20 MG/1
20 TABLET ORAL DAILY
Status: DISCONTINUED | OUTPATIENT
Start: 2022-12-03 | End: 2022-12-03

## 2022-12-02 RX ORDER — POLYETHYLENE GLYCOL 3350 17 G/17G
17 POWDER, FOR SOLUTION ORAL DAILY PRN
Status: DISCONTINUED | OUTPATIENT
Start: 2022-12-02 | End: 2022-12-03

## 2022-12-02 RX ORDER — SULFAMETHOXAZOLE AND TRIMETHOPRIM 800; 160 MG/1; MG/1
1 TABLET ORAL
Status: DISCONTINUED | OUTPATIENT
Start: 2022-12-02 | End: 2022-12-03

## 2022-12-02 RX ORDER — BISACODYL 10 MG
10 SUPPOSITORY, RECTAL RECTAL
Status: DISCONTINUED | OUTPATIENT
Start: 2022-12-02 | End: 2022-12-03

## 2022-12-02 RX ORDER — SODIUM PHOSPHATE, DIBASIC AND SODIUM PHOSPHATE, MONOBASIC 7; 19 G/133ML; G/133ML
1 ENEMA RECTAL ONCE AS NEEDED
Status: DISCONTINUED | OUTPATIENT
Start: 2022-12-02 | End: 2022-12-03

## 2022-12-02 RX ORDER — SENNOSIDES 8.6 MG
17.2 TABLET ORAL NIGHTLY PRN
Status: DISCONTINUED | OUTPATIENT
Start: 2022-12-02 | End: 2022-12-03

## 2022-12-02 RX ORDER — GABAPENTIN 300 MG/1
600 CAPSULE ORAL 3 TIMES DAILY
Status: DISCONTINUED | OUTPATIENT
Start: 2022-12-02 | End: 2022-12-03

## 2022-12-02 RX ORDER — IOHEXOL 350 MG/ML
100 INJECTION, SOLUTION INTRAVENOUS
Status: COMPLETED | OUTPATIENT
Start: 2022-12-02 | End: 2022-12-02

## 2022-12-02 RX ORDER — MORPHINE SULFATE 15 MG/1
15 TABLET, FILM COATED, EXTENDED RELEASE ORAL EVERY 12 HOURS SCHEDULED
COMMUNITY

## 2022-12-02 RX ORDER — MORPHINE SULFATE 15 MG/1
15 TABLET, FILM COATED, EXTENDED RELEASE ORAL 2 TIMES DAILY
Status: DISCONTINUED | OUTPATIENT
Start: 2022-12-02 | End: 2022-12-03

## 2022-12-02 RX ORDER — ENOXAPARIN SODIUM 100 MG/ML
40 INJECTION SUBCUTANEOUS DAILY
Status: DISCONTINUED | OUTPATIENT
Start: 2022-12-02 | End: 2022-12-03

## 2022-12-02 RX ORDER — ONDANSETRON 2 MG/ML
4 INJECTION INTRAMUSCULAR; INTRAVENOUS EVERY 6 HOURS PRN
Status: DISCONTINUED | OUTPATIENT
Start: 2022-12-02 | End: 2022-12-03

## 2022-12-02 RX ORDER — SODIUM CHLORIDE 9 MG/ML
1000 INJECTION, SOLUTION INTRAVENOUS CONTINUOUS
Status: ACTIVE | OUTPATIENT
Start: 2022-12-02 | End: 2022-12-02

## 2022-12-02 RX ORDER — DEXAMETHASONE SODIUM PHOSPHATE 4 MG/ML
4 VIAL (ML) INJECTION EVERY 12 HOURS
Status: DISCONTINUED | OUTPATIENT
Start: 2022-12-02 | End: 2022-12-03

## 2022-12-02 NOTE — ED QUICK NOTES
Pt given soup that she ordered, pt stated she didn't like it, turkey sandwich given. Pt calmer now, saying please and thank you after speaking with . Pt also reporting that she feels cold, warm blankets given.

## 2022-12-02 NOTE — ED QUICK NOTES
Pt yelling, attempting to get out of bed yelling call my  and I am hungry. Pt redirected back into bed, pt was informed that her  was called twice. Pts  was called again and phone given to patient. Pt was yelling at  on the phone.

## 2022-12-02 NOTE — PROGRESS NOTES
spoke to  who requested letter as patient is currently at the hospital. Letter completed and sent via [address] Rock Ott ( 1980) is a patient under my care at [facility] for her diagnosis of GBM. She was seen in office today 2022, but during her visit was sent to the Emergency Room and Hospitalized, where she will remain overnight to continue to be assessed. Her , Giuliano Arzola, is her primary caregiver and must remain with her at this time. Please excuse him from any service training responsibilities for 2022 and 2022.

## 2022-12-02 NOTE — ED INITIAL ASSESSMENT (HPI)
Pt reports hx of brain CA. Was at the cancer center today for IV infusion, states that she has been having a headache on the right side of her head for a few days along with difficulty breathing and feeling weak. O2 sat 96% on RA. Pt is A/Ox3. Left leg brace. Pt reports weakness and numbness to left arm and left leg per baseline.  States she ambulates very little with a walker

## 2022-12-02 NOTE — ED QUICK NOTES
pts  is now here at the bedside. states that pt has anger issues and sometimes can be demanding. Per pts  after they found the tumor it did become fore frequent. pts  updated regarding plan of care and that room is assigned but is getting cleaned.

## 2022-12-02 NOTE — ED QUICK NOTES
Orders for admission, patient is aware of plan and ready to go upstairs. Any questions, please call ED RN Alex Marks  at extension [phone]. Vaccinated?  Yes  Type of COVID test sent: Rapid - Negative  COVID Suspicion level: Low      Titratable drug(s) infusin.9 NaCl  Rate: 125ml/ hr    LOC at time of transport: A/Ox4    Other pertinent information: left ankle brace needed to stand and walk/transfer with shoes on/ Pt uses walker at home    CIWA score= N/A  NIH score= N/A

## 2022-12-03 VITALS
TEMPERATURE: 99 F | RESPIRATION RATE: 19 BRPM | HEIGHT: 61 IN | SYSTOLIC BLOOD PRESSURE: 135 MMHG | BODY MASS INDEX: 26.06 KG/M2 | OXYGEN SATURATION: 97 % | DIASTOLIC BLOOD PRESSURE: 87 MMHG | WEIGHT: 138 LBS | HEART RATE: 120 BPM

## 2022-12-03 PROCEDURE — 99225 SUBSEQUENT OBSERVATION CARE: CPT | Performed by: INTERNAL MEDICINE

## 2022-12-03 PROCEDURE — 99217 OBSERVATION CARE DISCHARGE: CPT | Performed by: HOSPITALIST

## 2022-12-03 RX ORDER — DEXAMETHASONE 2 MG/1
4 TABLET ORAL 2 TIMES DAILY WITH MEALS
Qty: 60 TABLET | Refills: 0 | Status: SHIPPED | OUTPATIENT
Start: 2022-12-03

## 2022-12-03 NOTE — CM/SW NOTE
12/03/22 0800   CM/SW Referral Data   Referral Source Physician   Reason for Referral Discharge planning   Informant EMR   Discharge Needs   Anticipated D/C needs Hospice; To be determined       Order received for hospice consult. Pt is a 38year old female with a PMH of GBM presented to ED from cancer center due to SOB. Message left for [facility] liaison regarding new referral.  Await call back. / to remain available for support and/or discharge planning. Wilman Rubin, [facility]  Discharge Planner  [phone]      Addendum:  Received call from Thor Polk with [facility] [employer].. She will contact pt/family to arrange meeting to discuss hospice services.

## 2022-12-03 NOTE — PLAN OF CARE
Patient A+Ox4. Has periods of forgetfulness. C/o headache and left arm pain, PRN tylenol given. Seizure precautions maintained. Fall precautions in place. Up with assist x2 and walker. Left brace on when ambulating. On IV steroids. Problem: SAFETY ADULT - FALL  Goal: Free from fall injury  Description: INTERVENTIONS:  - Assess pt frequently for physical needs  - Identify cognitive and physical deficits and behaviors that affect risk of falls.   - [place] fall precautions as indicated by assessment.  - Educate pt/family on patient safety including physical limitations  - Instruct pt to call for assistance with activity based on assessment  - Modify environment to reduce risk of injury  - Provide assistive devices as appropriate  - Consider OT/PT consult to assist with strengthening/mobility  - Encourage toileting schedule  Outcome: Progressing     Problem: RESPIRATORY - ADULT  Goal: Achieves optimal ventilation and oxygenation  Description: INTERVENTIONS:  - Assess for changes in respiratory status  - Assess for changes in mentation and behavior  - Position to facilitate oxygenation and minimize respiratory effort  - Oxygen supplementation based on oxygen saturation or ABGs  - Provide Smoking Cessation handout, if applicable  - Encourage broncho-pulmonary hygiene including cough, deep breathe, Incentive Spirometry  - Assess the need for suctioning and perform as needed  - Assess and instruct to report SOB or any respiratory difficulty  - Respiratory Therapy support as indicated  - Manage/alleviate anxiety  - Monitor for signs/symptoms of CO2 retention  Outcome: Progressing     Problem: PAIN - ADULT  Goal: Verbalizes/displays adequate comfort level or patient's stated pain goal  Description: INTERVENTIONS:  - Encourage pt to monitor pain and request assistance  - Assess pain using appropriate pain scale  - Administer analgesics based on type and severity of pain and evaluate response  - Implement non-pharmacological measures as appropriate and evaluate response  - Consider cultural and social influences on pain and pain management  - Manage/alleviate anxiety  - Utilize distraction and/or relaxation techniques  - Monitor for opioid side effects  - Notify MD/LIP if interventions unsuccessful or patient reports new pain  - Anticipate increased pain with activity and pre-medicate as appropriate  Outcome: Not Progressing

## 2022-12-03 NOTE — HOSPICE RN NOTE
[facility] nurse visit:  Informational visit. Pt. And  Gavin Henleyman in attendance. Keshav  used via language line. Explained [facility] philosophy of care. Questions answered through  Dorota Gonzalez). Pt states she is ready for hospice but her  would like to pursue more chemo. Pt. Deferring to husbands wishes. Pt. Would like to be GIP for EOL as she does not want to die at home. (Pt. Has small children at home). Okay for [facility] to follow up. Informational booklet provided, 24 hr # provided. Family to call when they are ready. Pt. Is in bed lethargic, oriented. Slight MOSLEY. Denies any pain/discomfort at this time. Would like to go home ASAP. Collaborated [id number].     Zion Coates RN  [facility]    [phone]

## 2022-12-03 NOTE — PROGRESS NOTES
Patient states that her headache is on the right side of her head states it a little better at this time. Patient ambulated to bathroom with assistance of left leg brace and staff. not able to use left arm. Appetite fair for dinner set required fed self. denies any c/o nausea.

## 2022-12-03 NOTE — PROGRESS NOTES
Patient discharged to home with her  . Explained medications to patient and her . They verbalized there understanding of teaching. Explained to them that they need to follow up with DR Ángel Bhatia next week.

## 2022-12-05 ENCOUNTER — TELEPHONE (OUTPATIENT)
Dept: HEMATOLOGY/ONCOLOGY | Facility: HOSPITAL | Age: 42
End: 2022-12-05

## 2022-12-05 ENCOUNTER — SOCIAL WORK SERVICES (OUTPATIENT)
Dept: HEMATOLOGY/ONCOLOGY | Facility: HOSPITAL | Age: 42
End: 2022-12-05

## 2022-12-05 NOTE — PROGRESS NOTES
spoke with  who stated that family changed their minds about admitting to Hospice, and would like to move forward with services. Family met with [facility] while [facility], and are comfortable moving forward with them.  spoke to [facility] who confirmed that they have been in contact with  and are meeting today for admission between 10a/11a. MD notified.

## 2022-12-05 NOTE — TELEPHONE ENCOUNTER
Blanca Campbell with [facility] just wanted to update that patient has decided to go along and be admitted into Hospice

## 2022-12-07 ENCOUNTER — TELEPHONE (OUTPATIENT)
Dept: HEMATOLOGY/ONCOLOGY | Facility: HOSPITAL | Age: 42
End: 2022-12-07

## 2022-12-07 NOTE — TELEPHONE ENCOUNTER
Patients  Richy Leal, has questions regarding her treatment.     Please call [facility]

## 2022-12-07 NOTE — TELEPHONE ENCOUNTER
Spoke with patient's . He is unsure if he made the correct decision to sign patient up for hospice. Prior to hospitalization,  abruptly stopped steroids and he feels he is the cause of her not feeling well. Patient wants to continue with treatment. He would like to come in to the office to discuss face to face. Informed  that patient can come in for a follow up but no labs will be done unless hospice has been revoked. He is ok with this, he just wants to talk with Dr Emerald King about patient's options. Message sent to PSRs to call  to schedule appt. Dr Emerald King updated.

## 2022-12-14 ENCOUNTER — OFFICE VISIT (OUTPATIENT)
Dept: HEMATOLOGY/ONCOLOGY | Age: 42
End: 2022-12-14
Attending: INTERNAL MEDICINE
Payer: OTHER GOVERNMENT

## 2022-12-14 VITALS
DIASTOLIC BLOOD PRESSURE: 106 MMHG | OXYGEN SATURATION: 96 % | RESPIRATION RATE: 18 BRPM | HEART RATE: 96 BPM | BODY MASS INDEX: 28 KG/M2 | SYSTOLIC BLOOD PRESSURE: 152 MMHG | TEMPERATURE: 98 F | WEIGHT: 145.81 LBS

## 2022-12-14 DIAGNOSIS — C71.9 GBM (GLIOBLASTOMA MULTIFORME) (HCC): Primary | ICD-10-CM

## 2022-12-14 LAB
ALBUMIN SERPL-MCNC: 3.6 G/DL (ref 3.4–5)
ALBUMIN/GLOB SERPL: 1 {RATIO} (ref 1–2)
ALP LIVER SERPL-CCNC: 75 U/L
ALT SERPL-CCNC: 56 U/L
ANION GAP SERPL CALC-SCNC: 9 MMOL/L (ref 0–18)
AST SERPL-CCNC: 17 U/L (ref 15–37)
BASOPHILS # BLD: 0 X10(3) UL (ref 0–0.2)
BASOPHILS NFR BLD: 0 %
BILIRUB SERPL-MCNC: 0.3 MG/DL (ref 0.1–2)
BUN BLD-MCNC: 20 MG/DL (ref 7–18)
CALCIUM BLD-MCNC: 9.3 MG/DL (ref 8.5–10.1)
CHLORIDE SERPL-SCNC: 104 MMOL/L (ref 98–112)
CO2 SERPL-SCNC: 27 MMOL/L (ref 21–32)
CREAT BLD-MCNC: 0.58 MG/DL
EOSINOPHIL # BLD: 0 X10(3) UL (ref 0–0.7)
EOSINOPHIL NFR BLD: 0 %
ERYTHROCYTE [DISTWIDTH] IN BLOOD BY AUTOMATED COUNT: 17.3 %
FASTING STATUS PATIENT QL REPORTED: NO
GFR SERPLBLD BASED ON 1.73 SQ M-ARVRAT: 117 ML/MIN/1.73M2 (ref 60–?)
GLOBULIN PLAS-MCNC: 3.5 G/DL (ref 2.8–4.4)
GLUCOSE BLD-MCNC: 163 MG/DL (ref 70–99)
HCT VFR BLD AUTO: 41.6 %
HGB BLD-MCNC: 14.1 G/DL
LYMPHOCYTES NFR BLD: 0.53 X10(3) UL (ref 1–4)
LYMPHOCYTES NFR BLD: 3 %
MCH RBC QN AUTO: 29.4 PG (ref 26–34)
MCHC RBC AUTO-ENTMCNC: 33.9 G/DL (ref 31–37)
MCV RBC AUTO: 86.8 FL
MONOCYTES # BLD: 0.71 X10(3) UL (ref 0.1–1)
MONOCYTES NFR BLD: 4 %
NEUTROPHILS # BLD AUTO: 15.72 X10 (3) UL (ref 1.5–7.7)
NEUTROPHILS NFR BLD: 88 %
NEUTS BAND NFR BLD: 5 %
NEUTS HYPERSEG # BLD: 16.55 X10(3) UL (ref 1.5–7.7)
OSMOLALITY SERPL CALC.SUM OF ELEC: 296 MOSM/KG (ref 275–295)
PLATELET # BLD AUTO: 275 10(3)UL (ref 150–450)
PLATELET MORPHOLOGY: NORMAL
POTASSIUM SERPL-SCNC: 3.9 MMOL/L (ref 3.5–5.1)
PROT SERPL-MCNC: 7.1 G/DL (ref 6.4–8.2)
RBC # BLD AUTO: 4.79 X10(6)UL
SODIUM SERPL-SCNC: 140 MMOL/L (ref 136–145)
TOTAL CELLS COUNTED BLD: 100
WBC # BLD AUTO: 17.8 X10(3) UL (ref 4–11)

## 2022-12-14 PROCEDURE — 99215 OFFICE O/P EST HI 40 MIN: CPT | Performed by: INTERNAL MEDICINE

## 2022-12-14 RX ORDER — TEMOZOLOMIDE 140 MG/1
140 CAPSULE ORAL DAILY
Qty: 5 CAPSULE | Refills: 3 | Status: SHIPPED | OUTPATIENT
Start: 2022-12-14

## 2022-12-14 RX ORDER — LORAZEPAM 2 MG/ML
SOLUTION, CONCENTRATE ORAL
COMMUNITY
Start: 2022-12-09

## 2022-12-14 RX ORDER — MORPHINE SULFATE 20 MG/ML
SOLUTION ORAL
COMMUNITY
Start: 2022-12-05

## 2022-12-14 RX ORDER — TEMOZOLOMIDE 100 MG/1
100 CAPSULE ORAL DAILY
Qty: 5 CAPSULE | Refills: 3 | Status: SHIPPED | OUTPATIENT
Start: 2022-12-14

## 2022-12-14 RX ORDER — LORAZEPAM 1 MG/1
TABLET ORAL
COMMUNITY
Start: 2022-12-09

## 2022-12-14 NOTE — PROGRESS NOTES
Per Dr Milton Brannon, patient will be revoking hospice. Restarting Mvasi and Temodar next week. Appts scheduled for patient before leaving the cancer center.

## 2022-12-14 NOTE — PROGRESS NOTES
Patient is here for MD f/u for GBM. Patient enrolled in hospice on 12/5.  is unsure he made the right decision. Patient wants to do chemotherapy. She is on Decadron 4 mg bid.  states patient is restless most of the time. Here to further discuss.     Education Record    Learner:  Patient and Spouse    Disease / Diagnosis: GBM    Barriers / Limitations:  Language   Comments:    Method:  Brief focused, Discussion and  utilized   Comments: Mellisa Nuñez #[id number]    General Topics:  Plan of care reviewed   Comments:    Outcome:  Shows understanding   Comments:

## 2022-12-16 ENCOUNTER — SOCIAL WORK SERVICES (OUTPATIENT)
Dept: HEMATOLOGY/ONCOLOGY | Facility: HOSPITAL | Age: 42
End: 2022-12-16

## 2022-12-16 ENCOUNTER — TELEPHONE (OUTPATIENT)
Dept: HEMATOLOGY/ONCOLOGY | Age: 42
End: 2022-12-16

## 2022-12-16 ENCOUNTER — TELEPHONE (OUTPATIENT)
Dept: HEMATOLOGY/ONCOLOGY | Facility: HOSPITAL | Age: 42
End: 2022-12-16

## 2022-12-16 DIAGNOSIS — C71.9 GBM (GLIOBLASTOMA MULTIFORME) (HCC): Primary | ICD-10-CM

## 2022-12-16 NOTE — TELEPHONE ENCOUNTER
Toxicities: C2 D1 Bevacizumab-awwb on 11/18/2022. Patient was then enrolled in hospice. Patient revoked hospice at visit with Dr Emerald King on 12/14/2022. Plan to start [address] next week.  reports that Josafat Michelle is now completely bedridden. She has been sleeping since 1 am on 12/15/2022. She will open her eyes for a few seconds when he is changing her diaper and whisper something that does not make sense. She then goes back to sleep. She is not able to open her mouth and has not had any thing to eat or drink since 12/14/2022. She is not able to take any medications by mouth. Her  said she developed  \"shingles\" on her right ear and neck yesterday. She is in pain, but is not able to rate her pain. Akosua Miles would like to speak with Dr Emerald King or his nurse about the next step. Should she resume hospice?

## 2022-12-16 NOTE — PROGRESS NOTES
alerted that Patient is declining and Family would like to initiate Hospice services again.  called and spoke with [facility] Roscoe Pickard to provide update; she reports that she will talk with Intake immediately and have services out to [facility] as soon as possible.  to remain available as needed.

## 2022-12-16 NOTE — TELEPHONE ENCOUNTER
Chary Nunez, pt's  states pt has been sleeping for 48 hours, intermittently wakes but speaks very softly and is confused. Pt's  states they revoked hospice bec she wanted to pursue chemo again. At this point, pt's performance status is continuing to decline and is not a candidate for further chemo. Chary Nunez wishes to reinstate hospice after further discussion. Apparently improved 2 days ago on steroids. Since she saw Dr. Keke Hammer on Wednesday, she rapidly declined to the point she is not awake enough to take any oral meds.       Jcarlos Valdez, [id number] [facility]

## 2022-12-16 NOTE — TELEPHONE ENCOUNTER
Patient is sleeping a lot, no energy, pain, pain 6-9/10,and the  is calling because he do not know what to do. He would like to explain what is going on with his wife he need advice, Dr. Torres Montilla pt.

## 2022-12-21 ENCOUNTER — APPOINTMENT (OUTPATIENT)
Dept: HEMATOLOGY/ONCOLOGY | Age: 42
End: 2022-12-21
Attending: INTERNAL MEDICINE
Payer: OTHER GOVERNMENT

## 2022-12-25 ENCOUNTER — APPOINTMENT (OUTPATIENT)
Dept: CT IMAGING | Age: 42
End: 2022-12-25
Attending: EMERGENCY MEDICINE

## 2022-12-25 ENCOUNTER — HOSPITAL ENCOUNTER (EMERGENCY)
Age: 42
Discharge: HOME | End: 2022-12-25
Attending: EMERGENCY MEDICINE

## 2022-12-25 VITALS
DIASTOLIC BLOOD PRESSURE: 105 MMHG | SYSTOLIC BLOOD PRESSURE: 135 MMHG | BODY MASS INDEX: 26.78 KG/M2 | RESPIRATION RATE: 17 BRPM | OXYGEN SATURATION: 96 % | TEMPERATURE: 99.5 F | HEART RATE: 92 BPM | WEIGHT: 141.76 LBS

## 2022-12-25 DIAGNOSIS — C71.9 GBM (GLIOBLASTOMA MULTIFORME) (CMD): ICD-10-CM

## 2022-12-25 DIAGNOSIS — R51.9 ACUTE NONINTRACTABLE HEADACHE, UNSPECIFIED HEADACHE TYPE: Primary | ICD-10-CM

## 2022-12-25 LAB
ALBUMIN SERPL-MCNC: 3 G/DL (ref 3.6–5.1)
ALBUMIN/GLOB SERPL: 0.7 {RATIO} (ref 1–2.4)
ALP SERPL-CCNC: 85 UNITS/L (ref 45–117)
ALT SERPL-CCNC: 52 UNITS/L
ANION GAP SERPL CALC-SCNC: 10 MMOL/L (ref 7–19)
AST SERPL-CCNC: 76 UNITS/L
BASOPHILS # BLD: 0 K/MCL (ref 0–0.3)
BASOPHILS NFR BLD: 0 %
BILIRUB SERPL-MCNC: 1.2 MG/DL (ref 0.2–1)
BUN SERPL-MCNC: 9 MG/DL (ref 6–20)
BUN/CREAT SERPL: 17 (ref 7–25)
CALCIUM SERPL-MCNC: 9 MG/DL (ref 8.4–10.2)
CHLORIDE SERPL-SCNC: 105 MMOL/L (ref 97–110)
CO2 SERPL-SCNC: 25 MMOL/L (ref 21–32)
CREAT SERPL-MCNC: 0.53 MG/DL (ref 0.51–0.95)
DEPRECATED RDW RBC: 50.5 FL (ref 39–50)
EOSINOPHIL # BLD: 0 K/MCL (ref 0–0.5)
EOSINOPHIL NFR BLD: 0 %
ERYTHROCYTE [DISTWIDTH] IN BLOOD: 15.6 % (ref 11–15)
FASTING DURATION TIME PATIENT: ABNORMAL H
GFR SERPLBLD BASED ON 1.73 SQ M-ARVRAT: >90 ML/MIN
GLOBULIN SER-MCNC: 4.6 G/DL (ref 2–4)
GLUCOSE SERPL-MCNC: 151 MG/DL (ref 70–99)
HCT VFR BLD CALC: 46.3 % (ref 36–46.5)
HGB BLD-MCNC: 15.5 G/DL (ref 12–15.5)
LYMPHOCYTES # BLD: 0.8 K/MCL (ref 1–4.8)
LYMPHOCYTES NFR BLD: 12 %
MCH RBC QN AUTO: 29.9 PG (ref 26–34)
MCHC RBC AUTO-ENTMCNC: 33.5 G/DL (ref 32–36.5)
MCV RBC AUTO: 89.2 FL (ref 78–100)
METAMYELOCYTES NFR BLD: 3 % (ref 0–2)
MONOCYTES # BLD: 0.3 K/MCL (ref 0.3–0.9)
MONOCYTES NFR BLD: 5 %
NEUTROPHILS # BLD: 5.3 K/MCL (ref 1.8–7.7)
NEUTS BAND NFR BLD: 2 % (ref 0–10)
NEUTS SEG NFR BLD: 78 %
NRBC BLD MANUAL-RTO: 0 /100 WBC
OVALOCYTES BLD QL SMEAR: ABNORMAL
PLAT MORPH BLD: NORMAL
PLATELET # BLD AUTO: 263 K/MCL (ref 140–450)
POLYCHROMASIA BLD QL SMEAR: ABNORMAL
POTASSIUM SERPL-SCNC: 3.2 MMOL/L (ref 3.4–5.1)
POTASSIUM SERPL-SCNC: 5.9 MMOL/L (ref 3.4–5.1)
PROT SERPL-MCNC: 7.6 G/DL (ref 6.4–8.2)
RAINBOW EXTRA TUBES HOLD SPECIMEN: NORMAL
RBC # BLD: 5.19 MIL/MCL (ref 4–5.2)
SODIUM SERPL-SCNC: 134 MMOL/L (ref 135–145)
WBC # BLD: 6.6 K/MCL (ref 4.2–11)
WBC MORPH BLD: NORMAL

## 2022-12-25 PROCEDURE — 80053 COMPREHEN METABOLIC PANEL: CPT | Performed by: EMERGENCY MEDICINE

## 2022-12-25 PROCEDURE — 99285 EMERGENCY DEPT VISIT HI MDM: CPT

## 2022-12-25 PROCEDURE — 84132 ASSAY OF SERUM POTASSIUM: CPT | Performed by: EMERGENCY MEDICINE

## 2022-12-25 PROCEDURE — 99284 EMERGENCY DEPT VISIT MOD MDM: CPT | Performed by: EMERGENCY MEDICINE

## 2022-12-25 PROCEDURE — 70450 CT HEAD/BRAIN W/O DYE: CPT

## 2022-12-25 PROCEDURE — 10002803 HB RX 637: Performed by: EMERGENCY MEDICINE

## 2022-12-25 PROCEDURE — 36415 COLL VENOUS BLD VENIPUNCTURE: CPT

## 2022-12-25 PROCEDURE — 85027 COMPLETE CBC AUTOMATED: CPT | Performed by: EMERGENCY MEDICINE

## 2022-12-25 PROCEDURE — G1004 CDSM NDSC: HCPCS

## 2022-12-25 RX ORDER — POTASSIUM CHLORIDE 20 MEQ/1
40 TABLET, EXTENDED RELEASE ORAL ONCE
Status: DISCONTINUED | OUTPATIENT
Start: 2022-12-25 | End: 2022-12-25 | Stop reason: CLARIF

## 2022-12-25 RX ORDER — POTASSIUM CHLORIDE 1.5 G/1.58G
40 POWDER, FOR SOLUTION ORAL ONCE
Status: COMPLETED | OUTPATIENT
Start: 2022-12-25 | End: 2022-12-25

## 2022-12-25 RX ADMIN — POTASSIUM CHLORIDE 40 MEQ: 1.5 POWDER, FOR SOLUTION ORAL at 11:58

## 2022-12-25 ASSESSMENT — PAIN SCALES - GENERAL: PAINLEVEL_OUTOF10: 6

## 2023-01-10 ENCOUNTER — TELEPHONE (OUTPATIENT)
Dept: HEMATOLOGY/ONCOLOGY | Facility: HOSPITAL | Age: 43
End: 2023-01-10

## 2023-01-10 NOTE — TELEPHONE ENCOUNTER
Francesca called from [facility] specialty pharmacy about the [facility]. They are asking for confirmation that patient is in hospice care. Per the notes patient has signed onto hospice care. [facility] will cancel the prescription.

## 2023-01-25 ENCOUNTER — APPOINTMENT (OUTPATIENT)
Dept: RADIATION ONCOLOGY | Facility: HOSPITAL | Age: 43
End: 2023-01-25
Attending: RADIOLOGY
Payer: OTHER GOVERNMENT

## 2023-02-11 ENCOUNTER — HOSPITAL ENCOUNTER (EMERGENCY)
Facility: HOSPITAL | Age: 43
Discharge: NURSING, REHAB OR LONG-TERM CARE | End: 2023-02-11
Attending: STUDENT IN AN ORGANIZED HEALTH CARE EDUCATION/TRAINING PROGRAM
Payer: OTHER GOVERNMENT

## 2023-02-11 ENCOUNTER — HOSPITAL ENCOUNTER (EMERGENCY)
Facility: HOSPITAL | Age: 43
Discharge: HOME | End: 2023-02-11
Attending: STUDENT IN AN ORGANIZED HEALTH CARE EDUCATION/TRAINING PROGRAM
Payer: OTHER GOVERNMENT

## 2023-02-11 ENCOUNTER — APPOINTMENT (OUTPATIENT)
Dept: CT IMAGING | Facility: HOSPITAL | Age: 43
End: 2023-02-11
Attending: STUDENT IN AN ORGANIZED HEALTH CARE EDUCATION/TRAINING PROGRAM
Payer: OTHER GOVERNMENT

## 2023-02-11 VITALS
WEIGHT: 147 LBS | RESPIRATION RATE: 13 BRPM | TEMPERATURE: 97 F | SYSTOLIC BLOOD PRESSURE: 176 MMHG | DIASTOLIC BLOOD PRESSURE: 110 MMHG | OXYGEN SATURATION: 95 % | BODY MASS INDEX: 27.75 KG/M2 | HEIGHT: 61 IN | HEART RATE: 78 BPM

## 2023-02-11 DIAGNOSIS — C71.9 GBM (GLIOBLASTOMA MULTIFORME) (HCC): Primary | ICD-10-CM

## 2023-02-11 PROCEDURE — 70450 CT HEAD/BRAIN W/O DYE: CPT | Performed by: STUDENT IN AN ORGANIZED HEALTH CARE EDUCATION/TRAINING PROGRAM

## 2023-02-11 PROCEDURE — 99284 EMERGENCY DEPT VISIT MOD MDM: CPT

## 2023-02-11 PROCEDURE — 96374 THER/PROPH/DIAG INJ IV PUSH: CPT

## 2023-02-11 PROCEDURE — 99285 EMERGENCY DEPT VISIT HI MDM: CPT

## 2023-02-11 PROCEDURE — 96375 TX/PRO/DX INJ NEW DRUG ADDON: CPT

## 2023-02-11 RX ORDER — MORPHINE SULFATE 4 MG/ML
4 INJECTION, SOLUTION INTRAMUSCULAR; INTRAVENOUS ONCE
Status: COMPLETED | OUTPATIENT
Start: 2023-02-11 | End: 2023-02-11

## 2023-02-11 RX ORDER — DIPHENHYDRAMINE HYDROCHLORIDE 50 MG/ML
25 INJECTION INTRAMUSCULAR; INTRAVENOUS ONCE
Status: COMPLETED | OUTPATIENT
Start: 2023-02-11 | End: 2023-02-11

## 2023-02-11 RX ORDER — METOCLOPRAMIDE HYDROCHLORIDE 5 MG/ML
10 INJECTION INTRAMUSCULAR; INTRAVENOUS ONCE
Status: COMPLETED | OUTPATIENT
Start: 2023-02-11 | End: 2023-02-11

## 2023-02-12 NOTE — ED QUICK NOTES
Pt stays at [facility], pt was home for the weekend. In the case pt gets discharged, Pt's  is requesting that pt goes back to nursing home. Room # 430 bed 3     Spoke to NIMO GILLETTE and Perri Sifuentes RN updates were provided. Best number to [facility] [phone] x [phone].

## 2023-02-12 NOTE — ED INITIAL ASSESSMENT (HPI)
Pt brought in from home. Per EMS report, pt has a brain tumor, undergoing radiation chemo. Pt has been complaining of pain to the right back side of head all day; pt's  was concerned and called EMS. BP was 190/124. Pt is a hospice pt, power of  placed in chart.

## 2023-03-01 ENCOUNTER — TELEPHONE (OUTPATIENT)
Dept: HEMATOLOGY/ONCOLOGY | Facility: HOSPITAL | Age: 43
End: 2023-03-01

## 2023-03-03 ENCOUNTER — SOCIAL WORK SERVICES (OUTPATIENT)
Dept: HEMATOLOGY/ONCOLOGY | Facility: HOSPITAL | Age: 43
End: 2023-03-03

## 2023-03-03 NOTE — PROGRESS NOTES
Pt here for C2D1. Arrives Via wheelchair, accompanied by Family member           Pregnancy screening: Denies possibility of pregnancy    Modifications in dose or schedule: No    Drugs/infusions dual verified for appearance and physical integrity. IV pump settings were dual verified: yes     Frequency of blood return and site check throughout administration: Prior to administration   Discharged to Home, Via wheelchair, accompanied by:Family member    Outpatient Oncology Care Plan  Problem list:  knowledge deficit  Problems related to:  side effect of treatment  Interventions:  monitor effect of therapy  Expected outcomes:  understands plan of care  Progress towards outcome:  making progress    Education Record    Learner:  Patient and Family Member  Barriers / Limitations:  None  Method:  Discussion and Printed material  Outcome:  Shows understanding  Comments:    Here for treatment. Seen by APN before. Labs drawn. Left sided weakness with leg brace. Tolerated infusion. Next appt/MD/treatment day scheduled. Left in stable condition with family member.
DISPLAY PLAN FREE TEXT
No

## 2023-03-13 ENCOUNTER — APPOINTMENT (OUTPATIENT)
Dept: CT IMAGING | Age: 43
End: 2023-03-13
Attending: EMERGENCY MEDICINE

## 2023-03-13 ENCOUNTER — HOSPITAL ENCOUNTER (OUTPATIENT)
Age: 43
Setting detail: OBSERVATION
Discharge: HOSPICE | End: 2023-03-17
Attending: EMERGENCY MEDICINE | Admitting: INTERNAL MEDICINE

## 2023-03-13 DIAGNOSIS — C71.9 GLIOBLASTOMA (CMD): Primary | ICD-10-CM

## 2023-03-13 DIAGNOSIS — G89.3 CHRONIC PAIN DUE TO NEOPLASM: ICD-10-CM

## 2023-03-13 LAB
ALBUMIN SERPL-MCNC: 3.5 G/DL (ref 3.6–5.1)
ALBUMIN/GLOB SERPL: 1 {RATIO} (ref 1–2.4)
ALP SERPL-CCNC: 103 UNITS/L (ref 45–117)
ALT SERPL-CCNC: 147 UNITS/L
ANION GAP SERPL CALC-SCNC: 8 MMOL/L (ref 7–19)
AST SERPL-CCNC: 59 UNITS/L
ATRIAL RATE (BPM): 120
BASOPHILS # BLD: 0 K/MCL (ref 0–0.3)
BASOPHILS NFR BLD: 0 %
BILIRUB SERPL-MCNC: 2.5 MG/DL (ref 0.2–1)
BUN SERPL-MCNC: 10 MG/DL (ref 6–20)
BUN/CREAT SERPL: 48 (ref 7–25)
CALCIUM SERPL-MCNC: 9.3 MG/DL (ref 8.4–10.2)
CHLORIDE SERPL-SCNC: 98 MMOL/L (ref 97–110)
CO2 SERPL-SCNC: 30 MMOL/L (ref 21–32)
CREAT SERPL-MCNC: 0.21 MG/DL (ref 0.51–0.95)
DEPRECATED RDW RBC: 48 FL (ref 39–50)
EOSINOPHIL # BLD: 0 K/MCL (ref 0–0.5)
EOSINOPHIL NFR BLD: 0 %
ERYTHROCYTE [DISTWIDTH] IN BLOOD: 14.6 % (ref 11–15)
FASTING DURATION TIME PATIENT: ABNORMAL H
GFR SERPLBLD BASED ON 1.73 SQ M-ARVRAT: >90 ML/MIN
GLOBULIN SER-MCNC: 3.4 G/DL (ref 2–4)
GLUCOSE SERPL-MCNC: 170 MG/DL (ref 70–99)
HCG SERPL-ACNC: <2 MUNITS/ML
HCT VFR BLD CALC: 46.4 % (ref 36–46.5)
HGB BLD-MCNC: 16.4 G/DL (ref 12–15.5)
IMM GRANULOCYTES # BLD AUTO: 0.2 K/MCL (ref 0–0.2)
IMM GRANULOCYTES # BLD: 2 %
LYMPHOCYTES # BLD: 0.5 K/MCL (ref 1–4.8)
LYMPHOCYTES NFR BLD: 7 %
MAGNESIUM SERPL-MCNC: 2.4 MG/DL (ref 1.7–2.4)
MCH RBC QN AUTO: 31.8 PG (ref 26–34)
MCHC RBC AUTO-ENTMCNC: 35.3 G/DL (ref 32–36.5)
MCV RBC AUTO: 89.9 FL (ref 78–100)
MONOCYTES # BLD: 0.6 K/MCL (ref 0.3–0.9)
MONOCYTES NFR BLD: 7 %
NEUTROPHILS # BLD: 6.7 K/MCL (ref 1.8–7.7)
NEUTROPHILS NFR BLD: 84 %
NRBC BLD MANUAL-RTO: 0 /100 WBC
P AXIS (DEGREES): 23
PLATELET # BLD AUTO: 176 K/MCL (ref 140–450)
POTASSIUM SERPL-SCNC: 3.6 MMOL/L (ref 3.4–5.1)
PR-INTERVAL (MSEC): 149
PROT SERPL-MCNC: 6.9 G/DL (ref 6.4–8.2)
QRS-INTERVAL (MSEC): 86
QT-INTERVAL (MSEC): 300
QTC: 424
R AXIS (DEGREES): 152
RBC # BLD: 5.16 MIL/MCL (ref 4–5.2)
REPORT TEXT: NORMAL
SARS-COV-2 RNA RESP QL NAA+PROBE: NOT DETECTED
SERVICE CMNT-IMP: NORMAL
SERVICE CMNT-IMP: NORMAL
SODIUM SERPL-SCNC: 132 MMOL/L (ref 135–145)
T AXIS (DEGREES): -17
TROPONIN I SERPL DL<=0.01 NG/ML-MCNC: 100 NG/L
TROPONIN I SERPL DL<=0.01 NG/ML-MCNC: 102 NG/L
VENTRICULAR RATE EKG/MIN (BPM): 120
WBC # BLD: 8 K/MCL (ref 4.2–11)

## 2023-03-13 PROCEDURE — 85025 COMPLETE CBC W/AUTO DIFF WBC: CPT | Performed by: EMERGENCY MEDICINE

## 2023-03-13 PROCEDURE — 96375 TX/PRO/DX INJ NEW DRUG ADDON: CPT

## 2023-03-13 PROCEDURE — C9803 HOPD COVID-19 SPEC COLLECT: HCPCS

## 2023-03-13 PROCEDURE — 96374 THER/PROPH/DIAG INJ IV PUSH: CPT

## 2023-03-13 PROCEDURE — 10002801 HB RX 250 W/O HCPCS: Performed by: EMERGENCY MEDICINE

## 2023-03-13 PROCEDURE — 70450 CT HEAD/BRAIN W/O DYE: CPT

## 2023-03-13 PROCEDURE — 10002805 HB CONTRAST AGENT: Performed by: EMERGENCY MEDICINE

## 2023-03-13 PROCEDURE — 93010 ELECTROCARDIOGRAM REPORT: CPT | Performed by: INTERNAL MEDICINE

## 2023-03-13 PROCEDURE — G0378 HOSPITAL OBSERVATION PER HR: HCPCS

## 2023-03-13 PROCEDURE — 10002807 HB RX 258: Performed by: EMERGENCY MEDICINE

## 2023-03-13 PROCEDURE — 99285 EMERGENCY DEPT VISIT HI MDM: CPT | Performed by: EMERGENCY MEDICINE

## 2023-03-13 PROCEDURE — 96376 TX/PRO/DX INJ SAME DRUG ADON: CPT

## 2023-03-13 PROCEDURE — 13003289 HB OXYGEN THERAPY DAILY

## 2023-03-13 PROCEDURE — 84484 ASSAY OF TROPONIN QUANT: CPT | Performed by: EMERGENCY MEDICINE

## 2023-03-13 PROCEDURE — 93005 ELECTROCARDIOGRAM TRACING: CPT | Performed by: EMERGENCY MEDICINE

## 2023-03-13 PROCEDURE — 99285 EMERGENCY DEPT VISIT HI MDM: CPT

## 2023-03-13 PROCEDURE — 10002800 HB RX 250 W HCPCS: Performed by: EMERGENCY MEDICINE

## 2023-03-13 PROCEDURE — G1004 CDSM NDSC: HCPCS

## 2023-03-13 PROCEDURE — 96361 HYDRATE IV INFUSION ADD-ON: CPT

## 2023-03-13 PROCEDURE — 84702 CHORIONIC GONADOTROPIN TEST: CPT | Performed by: EMERGENCY MEDICINE

## 2023-03-13 PROCEDURE — 74177 CT ABD & PELVIS W/CONTRAST: CPT

## 2023-03-13 PROCEDURE — 83735 ASSAY OF MAGNESIUM: CPT | Performed by: EMERGENCY MEDICINE

## 2023-03-13 PROCEDURE — 87635 SARS-COV-2 COVID-19 AMP PRB: CPT | Performed by: EMERGENCY MEDICINE

## 2023-03-13 PROCEDURE — 10004651 HB RX, NO CHARGE ITEM: Performed by: EMERGENCY MEDICINE

## 2023-03-13 PROCEDURE — 80053 COMPREHEN METABOLIC PANEL: CPT | Performed by: EMERGENCY MEDICINE

## 2023-03-13 RX ORDER — ONDANSETRON 2 MG/ML
4 INJECTION INTRAMUSCULAR; INTRAVENOUS 2 TIMES DAILY PRN
Status: DISCONTINUED | OUTPATIENT
Start: 2023-03-13 | End: 2023-03-17 | Stop reason: HOSPADM

## 2023-03-13 RX ORDER — LORAZEPAM 2 MG/ML
1 INJECTION INTRAMUSCULAR ONCE
Status: COMPLETED | OUTPATIENT
Start: 2023-03-13 | End: 2023-03-13

## 2023-03-13 RX ORDER — LORAZEPAM 2 MG/ML
1 INJECTION INTRAMUSCULAR EVERY 8 HOURS PRN
Status: DISCONTINUED | OUTPATIENT
Start: 2023-03-13 | End: 2023-03-17 | Stop reason: HOSPADM

## 2023-03-13 RX ORDER — 0.9 % SODIUM CHLORIDE 0.9 %
2 VIAL (ML) INJECTION EVERY 12 HOURS SCHEDULED
Status: DISCONTINUED | OUTPATIENT
Start: 2023-03-13 | End: 2023-03-17 | Stop reason: HOSPADM

## 2023-03-13 RX ORDER — ACETAMINOPHEN 325 MG/1
650 TABLET ORAL EVERY 4 HOURS PRN
Status: DISCONTINUED | OUTPATIENT
Start: 2023-03-13 | End: 2023-03-17 | Stop reason: HOSPADM

## 2023-03-13 RX ORDER — HYDRALAZINE HYDROCHLORIDE 20 MG/ML
10 INJECTION INTRAMUSCULAR; INTRAVENOUS EVERY 6 HOURS PRN
Status: DISCONTINUED | OUTPATIENT
Start: 2023-03-13 | End: 2023-03-17 | Stop reason: HOSPADM

## 2023-03-13 RX ORDER — ONDANSETRON 2 MG/ML
4 INJECTION INTRAMUSCULAR; INTRAVENOUS ONCE
Status: COMPLETED | OUTPATIENT
Start: 2023-03-13 | End: 2023-03-13

## 2023-03-13 RX ADMIN — IOHEXOL 74 ML: 350 INJECTION, SOLUTION INTRAVENOUS at 11:34

## 2023-03-13 RX ADMIN — SODIUM CHLORIDE 1000 ML: 9 INJECTION, SOLUTION INTRAVENOUS at 12:42

## 2023-03-13 RX ADMIN — SODIUM CHLORIDE, PRESERVATIVE FREE 2 ML: 5 INJECTION INTRAVENOUS at 19:02

## 2023-03-13 RX ADMIN — LORAZEPAM 1 MG: 2 INJECTION INTRAMUSCULAR; INTRAVENOUS at 16:32

## 2023-03-13 RX ADMIN — ONDANSETRON 4 MG: 2 INJECTION INTRAMUSCULAR; INTRAVENOUS at 12:42

## 2023-03-13 RX ADMIN — LORAZEPAM 1 MG: 2 INJECTION INTRAMUSCULAR; INTRAVENOUS at 13:01

## 2023-03-13 RX ADMIN — HYDROMORPHONE HYDROCHLORIDE 1 MG: 1 INJECTION, SOLUTION INTRAMUSCULAR; INTRAVENOUS; SUBCUTANEOUS at 16:53

## 2023-03-13 RX ADMIN — FENTANYL CITRATE 50 MCG: 50 INJECTION INTRAMUSCULAR; INTRAVENOUS at 12:43

## 2023-03-13 RX ADMIN — MORPHINE SULFATE 4 MG: 4 INJECTION, SOLUTION INTRAMUSCULAR; INTRAVENOUS at 11:20

## 2023-03-13 RX ADMIN — FENTANYL CITRATE 50 MCG: 50 INJECTION, SOLUTION INTRAMUSCULAR; INTRAVENOUS at 14:50

## 2023-03-13 ASSESSMENT — PAIN SCALES - WONG BAKER: WONGBAKER_NUMERICALRESPONSE: 5

## 2023-03-13 ASSESSMENT — PAIN SCALES - GENERAL
PAINLEVEL_OUTOF10: 5
PAINLEVEL_OUTOF10: 5

## 2023-03-14 LAB
ANION GAP SERPL CALC-SCNC: 11 MMOL/L (ref 7–19)
ATRIAL RATE (BPM): 123
BASOPHILS # BLD: 0 K/MCL (ref 0–0.3)
BASOPHILS NFR BLD: 0 %
BUN SERPL-MCNC: 9 MG/DL (ref 6–20)
BUN/CREAT SERPL: 28 (ref 7–25)
CALCIUM SERPL-MCNC: 9.3 MG/DL (ref 8.4–10.2)
CHLORIDE SERPL-SCNC: 100 MMOL/L (ref 97–110)
CO2 SERPL-SCNC: 27 MMOL/L (ref 21–32)
CREAT SERPL-MCNC: 0.32 MG/DL (ref 0.51–0.95)
DEPRECATED RDW RBC: 48.4 FL (ref 39–50)
EOSINOPHIL # BLD: 0 K/MCL (ref 0–0.5)
EOSINOPHIL NFR BLD: 0 %
ERYTHROCYTE [DISTWIDTH] IN BLOOD: 14.6 % (ref 11–15)
FASTING DURATION TIME PATIENT: ABNORMAL H
GFR SERPLBLD BASED ON 1.73 SQ M-ARVRAT: >90 ML/MIN
GLUCOSE SERPL-MCNC: 135 MG/DL (ref 70–99)
HCT VFR BLD CALC: 46.7 % (ref 36–46.5)
HGB BLD-MCNC: 16.5 G/DL (ref 12–15.5)
IMM GRANULOCYTES # BLD AUTO: 0.2 K/MCL (ref 0–0.2)
IMM GRANULOCYTES # BLD: 2 %
LYMPHOCYTES # BLD: 1.7 K/MCL (ref 1–4.8)
LYMPHOCYTES NFR BLD: 17 %
MAGNESIUM SERPL-MCNC: 2.4 MG/DL (ref 1.7–2.4)
MCH RBC QN AUTO: 31.9 PG (ref 26–34)
MCHC RBC AUTO-ENTMCNC: 35.3 G/DL (ref 32–36.5)
MCV RBC AUTO: 90.2 FL (ref 78–100)
MONOCYTES # BLD: 0.7 K/MCL (ref 0.3–0.9)
MONOCYTES NFR BLD: 7 %
NEUTROPHILS # BLD: 7.1 K/MCL (ref 1.8–7.7)
NEUTROPHILS NFR BLD: 74 %
NRBC BLD MANUAL-RTO: 0 /100 WBC
P AXIS (DEGREES): 32
PHOSPHATE SERPL-MCNC: 3.6 MG/DL (ref 2.4–4.7)
PLATELET # BLD AUTO: 190 K/MCL (ref 140–450)
POTASSIUM SERPL-SCNC: 2.5 MMOL/L (ref 3.4–5.1)
PR-INTERVAL (MSEC): 145
QRS-INTERVAL (MSEC): 82
QT-INTERVAL (MSEC): 327
QTC: 468
R AXIS (DEGREES): 137
RBC # BLD: 5.18 MIL/MCL (ref 4–5.2)
REPORT TEXT: NORMAL
SODIUM SERPL-SCNC: 135 MMOL/L (ref 135–145)
T AXIS (DEGREES): -4
TROPONIN I SERPL DL<=0.01 NG/ML-MCNC: 112 NG/L
TROPONIN I SERPL DL<=0.01 NG/ML-MCNC: 155 NG/L
VENTRICULAR RATE EKG/MIN (BPM): 123
WBC # BLD: 9.7 K/MCL (ref 4.2–11)

## 2023-03-14 PROCEDURE — 10002800 HB RX 250 W HCPCS: Performed by: NURSE PRACTITIONER

## 2023-03-14 PROCEDURE — 84100 ASSAY OF PHOSPHORUS: CPT | Performed by: NURSE PRACTITIONER

## 2023-03-14 PROCEDURE — 83735 ASSAY OF MAGNESIUM: CPT | Performed by: NURSE PRACTITIONER

## 2023-03-14 PROCEDURE — 96376 TX/PRO/DX INJ SAME DRUG ADON: CPT

## 2023-03-14 PROCEDURE — 10002800 HB RX 250 W HCPCS: Performed by: INTERNAL MEDICINE

## 2023-03-14 PROCEDURE — 85025 COMPLETE CBC W/AUTO DIFF WBC: CPT | Performed by: NURSE PRACTITIONER

## 2023-03-14 PROCEDURE — 93010 ELECTROCARDIOGRAM REPORT: CPT | Performed by: INTERNAL MEDICINE

## 2023-03-14 PROCEDURE — 80048 BASIC METABOLIC PNL TOTAL CA: CPT | Performed by: NURSE PRACTITIONER

## 2023-03-14 PROCEDURE — 84484 ASSAY OF TROPONIN QUANT: CPT | Performed by: NURSE PRACTITIONER

## 2023-03-14 PROCEDURE — 36415 COLL VENOUS BLD VENIPUNCTURE: CPT | Performed by: NURSE PRACTITIONER

## 2023-03-14 PROCEDURE — 10002803 HB RX 637: Performed by: INTERNAL MEDICINE

## 2023-03-14 PROCEDURE — 96375 TX/PRO/DX INJ NEW DRUG ADDON: CPT

## 2023-03-14 PROCEDURE — 10002803 HB RX 637

## 2023-03-14 PROCEDURE — G0378 HOSPITAL OBSERVATION PER HR: HCPCS

## 2023-03-14 PROCEDURE — 13003289 HB OXYGEN THERAPY DAILY: Performed by: INTERNAL MEDICINE

## 2023-03-14 PROCEDURE — 84484 ASSAY OF TROPONIN QUANT: CPT

## 2023-03-14 PROCEDURE — 93005 ELECTROCARDIOGRAM TRACING: CPT | Performed by: NURSE PRACTITIONER

## 2023-03-14 PROCEDURE — 10004651 HB RX, NO CHARGE ITEM: Performed by: EMERGENCY MEDICINE

## 2023-03-14 RX ORDER — DEXAMETHASONE 4 MG/1
4 TABLET ORAL 2 TIMES DAILY WITH MEALS
COMMUNITY

## 2023-03-14 RX ORDER — LEVETIRACETAM 500 MG/1
500 TABLET ORAL EVERY 12 HOURS SCHEDULED
Status: DISCONTINUED | OUTPATIENT
Start: 2023-03-14 | End: 2023-03-17 | Stop reason: HOSPADM

## 2023-03-14 RX ORDER — AMLODIPINE BESYLATE 10 MG/1
10 TABLET ORAL DAILY
Status: DISCONTINUED | OUTPATIENT
Start: 2023-03-14 | End: 2023-03-17 | Stop reason: HOSPADM

## 2023-03-14 RX ORDER — POTASSIUM CHLORIDE 14.9 MG/ML
20 INJECTION INTRAVENOUS ONCE
Status: COMPLETED | OUTPATIENT
Start: 2023-03-14 | End: 2023-03-14

## 2023-03-14 RX ORDER — QUETIAPINE FUMARATE 25 MG/1
25 TABLET, FILM COATED ORAL 2 TIMES DAILY
COMMUNITY

## 2023-03-14 RX ORDER — ACETAMINOPHEN 650 MG/1
650 SUPPOSITORY RECTAL EVERY 6 HOURS PRN
COMMUNITY

## 2023-03-14 RX ORDER — LEVETIRACETAM 500 MG/1
500 TABLET ORAL 2 TIMES DAILY
COMMUNITY

## 2023-03-14 RX ORDER — MORPHINE SULFATE 100 MG/5ML
5 SOLUTION ORAL
COMMUNITY

## 2023-03-14 RX ORDER — ATROPINE SULFATE 10 MG/ML
2 SOLUTION/ DROPS OPHTHALMIC
COMMUNITY

## 2023-03-14 RX ORDER — FENTANYL 75 UG/1
1 PATCH TRANSDERMAL
COMMUNITY

## 2023-03-14 RX ORDER — LORAZEPAM 2 MG/ML
1 CONCENTRATE ORAL
COMMUNITY

## 2023-03-14 RX ORDER — BISACODYL 10 MG
10 SUPPOSITORY, RECTAL RECTAL DAILY PRN
COMMUNITY

## 2023-03-14 RX ADMIN — LEVETIRACETAM 500 MG: 500 TABLET, FILM COATED ORAL at 13:37

## 2023-03-14 RX ADMIN — HYDROMORPHONE HYDROCHLORIDE 1 MG: 1 INJECTION, SOLUTION INTRAMUSCULAR; INTRAVENOUS; SUBCUTANEOUS at 13:38

## 2023-03-14 RX ADMIN — SODIUM CHLORIDE, PRESERVATIVE FREE 2 ML: 5 INJECTION INTRAVENOUS at 10:02

## 2023-03-14 RX ADMIN — SODIUM CHLORIDE, PRESERVATIVE FREE 2 ML: 5 INJECTION INTRAVENOUS at 20:05

## 2023-03-14 RX ADMIN — HYDROMORPHONE HYDROCHLORIDE 1 MG: 1 INJECTION, SOLUTION INTRAMUSCULAR; INTRAVENOUS; SUBCUTANEOUS at 07:03

## 2023-03-14 RX ADMIN — AMLODIPINE BESYLATE 10 MG: 10 TABLET ORAL at 10:10

## 2023-03-14 RX ADMIN — HYDRALAZINE HYDROCHLORIDE 10 MG: 20 INJECTION, SOLUTION INTRAMUSCULAR; INTRAVENOUS at 00:22

## 2023-03-14 RX ADMIN — HYDROMORPHONE HYDROCHLORIDE 1 MG: 1 INJECTION, SOLUTION INTRAMUSCULAR; INTRAVENOUS; SUBCUTANEOUS at 23:59

## 2023-03-14 RX ADMIN — HYDROMORPHONE HYDROCHLORIDE 1 MG: 1 INJECTION, SOLUTION INTRAMUSCULAR; INTRAVENOUS; SUBCUTANEOUS at 18:13

## 2023-03-14 RX ADMIN — HYDRALAZINE HYDROCHLORIDE 10 MG: 20 INJECTION, SOLUTION INTRAMUSCULAR; INTRAVENOUS at 06:23

## 2023-03-14 RX ADMIN — POTASSIUM CHLORIDE 20 MEQ: 14.9 INJECTION, SOLUTION INTRAVENOUS at 10:08

## 2023-03-14 RX ADMIN — LEVETIRACETAM 500 MG: 500 TABLET, FILM COATED ORAL at 20:05

## 2023-03-14 RX ADMIN — POTASSIUM CHLORIDE 20 MEQ: 14.9 INJECTION, SOLUTION INTRAVENOUS at 06:17

## 2023-03-14 ASSESSMENT — PAIN SCALES - WONG BAKER
WONGBAKER_NUMERICALRESPONSE: 0
WONGBAKER_NUMERICALRESPONSE: 1

## 2023-03-14 ASSESSMENT — COLUMBIA-SUICIDE SEVERITY RATING SCALE - C-SSRS: IS THE PATIENT ABLE TO COMPLETE C-SSRS: NO, DEFER FOR ACUTE CONFUSION OR ALTERED MENTAL STATUS

## 2023-03-14 ASSESSMENT — PATIENT HEALTH QUESTIONNAIRE - PHQ9: IS PATIENT ABLE TO COMPLETE PHQ2 OR PHQ9: NO, PATIENT WILL NEVER BE ABLE TO COMPLETE

## 2023-03-14 ASSESSMENT — PAIN SCALES - GENERAL: PAINLEVEL_OUTOF10: 0

## 2023-03-15 LAB
ANION GAP SERPL CALC-SCNC: 10 MMOL/L (ref 7–19)
BASOPHILS # BLD: 0 K/MCL (ref 0–0.3)
BASOPHILS NFR BLD: 0 %
BUN SERPL-MCNC: 7 MG/DL (ref 6–20)
BUN/CREAT SERPL: 26 (ref 7–25)
CALCIUM SERPL-MCNC: 9 MG/DL (ref 8.4–10.2)
CHLORIDE SERPL-SCNC: 98 MMOL/L (ref 97–110)
CO2 SERPL-SCNC: 28 MMOL/L (ref 21–32)
CREAT SERPL-MCNC: 0.27 MG/DL (ref 0.51–0.95)
DEPRECATED RDW RBC: 50 FL (ref 39–50)
EOSINOPHIL # BLD: 0 K/MCL (ref 0–0.5)
EOSINOPHIL NFR BLD: 0 %
ERYTHROCYTE [DISTWIDTH] IN BLOOD: 14.6 % (ref 11–15)
FASTING DURATION TIME PATIENT: ABNORMAL H
GFR SERPLBLD BASED ON 1.73 SQ M-ARVRAT: >90 ML/MIN
GLUCOSE SERPL-MCNC: 180 MG/DL (ref 70–99)
HCT VFR BLD CALC: 46.9 % (ref 36–46.5)
HGB BLD-MCNC: 16.3 G/DL (ref 12–15.5)
LYMPHOCYTES # BLD: 0.9 K/MCL (ref 1–4.8)
LYMPHOCYTES NFR BLD: 10 %
MAGNESIUM SERPL-MCNC: 2.1 MG/DL (ref 1.7–2.4)
MCH RBC QN AUTO: 31.9 PG (ref 26–34)
MCHC RBC AUTO-ENTMCNC: 34.8 G/DL (ref 32–36.5)
MCV RBC AUTO: 91.8 FL (ref 78–100)
METAMYELOCYTES NFR BLD: 1 % (ref 0–2)
MONOCYTES # BLD: 0.3 K/MCL (ref 0.3–0.9)
MONOCYTES NFR BLD: 4 %
NEUTROPHILS # BLD: 7.2 K/MCL (ref 1.8–7.7)
NEUTS BAND NFR BLD: 1 % (ref 0–10)
NEUTS SEG NFR BLD: 84 %
NRBC BLD MANUAL-RTO: 0 /100 WBC
PHOSPHATE SERPL-MCNC: 2.7 MG/DL (ref 2.4–4.7)
PLAT MORPH BLD: NORMAL
PLATELET # BLD AUTO: 141 K/MCL (ref 140–450)
POTASSIUM SERPL-SCNC: 2.8 MMOL/L (ref 3.4–5.1)
POTASSIUM SERPL-SCNC: 3.8 MMOL/L (ref 3.4–5.1)
RBC # BLD: 5.11 MIL/MCL (ref 4–5.2)
RBC MORPH BLD: NORMAL
SODIUM SERPL-SCNC: 133 MMOL/L (ref 135–145)
TROPONIN I SERPL DL<=0.01 NG/ML-MCNC: 136 NG/L
WBC # BLD: 8.5 K/MCL (ref 4.2–11)
WBC MORPH BLD: NORMAL

## 2023-03-15 PROCEDURE — 10002803 HB RX 637

## 2023-03-15 PROCEDURE — 83735 ASSAY OF MAGNESIUM: CPT | Performed by: NURSE PRACTITIONER

## 2023-03-15 PROCEDURE — 10002800 HB RX 250 W HCPCS: Performed by: INTERNAL MEDICINE

## 2023-03-15 PROCEDURE — 80048 BASIC METABOLIC PNL TOTAL CA: CPT | Performed by: NURSE PRACTITIONER

## 2023-03-15 PROCEDURE — 85027 COMPLETE CBC AUTOMATED: CPT | Performed by: NURSE PRACTITIONER

## 2023-03-15 PROCEDURE — 84100 ASSAY OF PHOSPHORUS: CPT | Performed by: NURSE PRACTITIONER

## 2023-03-15 PROCEDURE — G0378 HOSPITAL OBSERVATION PER HR: HCPCS

## 2023-03-15 PROCEDURE — 13003289 HB OXYGEN THERAPY DAILY: Performed by: INTERNAL MEDICINE

## 2023-03-15 PROCEDURE — 10004651 HB RX, NO CHARGE ITEM: Performed by: EMERGENCY MEDICINE

## 2023-03-15 PROCEDURE — 10002803 HB RX 637: Performed by: NURSE PRACTITIONER

## 2023-03-15 PROCEDURE — 96376 TX/PRO/DX INJ SAME DRUG ADON: CPT

## 2023-03-15 PROCEDURE — 84484 ASSAY OF TROPONIN QUANT: CPT

## 2023-03-15 PROCEDURE — 36415 COLL VENOUS BLD VENIPUNCTURE: CPT | Performed by: NURSE PRACTITIONER

## 2023-03-15 PROCEDURE — 10002803 HB RX 637: Performed by: INTERNAL MEDICINE

## 2023-03-15 PROCEDURE — 51798 US URINE CAPACITY MEASURE: CPT

## 2023-03-15 PROCEDURE — 84132 ASSAY OF SERUM POTASSIUM: CPT | Performed by: NURSE PRACTITIONER

## 2023-03-15 RX ORDER — POTASSIUM CHLORIDE 20 MEQ/1
40 TABLET, EXTENDED RELEASE ORAL ONCE
Status: COMPLETED | OUTPATIENT
Start: 2023-03-15 | End: 2023-03-15

## 2023-03-15 RX ADMIN — AMLODIPINE BESYLATE 10 MG: 10 TABLET ORAL at 08:26

## 2023-03-15 RX ADMIN — HYDROMORPHONE HYDROCHLORIDE 1 MG: 1 INJECTION, SOLUTION INTRAMUSCULAR; INTRAVENOUS; SUBCUTANEOUS at 06:19

## 2023-03-15 RX ADMIN — SODIUM CHLORIDE, PRESERVATIVE FREE 2 ML: 5 INJECTION INTRAVENOUS at 21:01

## 2023-03-15 RX ADMIN — LORAZEPAM 1 MG: 2 INJECTION INTRAMUSCULAR; INTRAVENOUS at 01:22

## 2023-03-15 RX ADMIN — LEVETIRACETAM 500 MG: 500 TABLET, FILM COATED ORAL at 21:01

## 2023-03-15 RX ADMIN — HYDROMORPHONE HYDROCHLORIDE 1 MG: 1 INJECTION, SOLUTION INTRAMUSCULAR; INTRAVENOUS; SUBCUTANEOUS at 20:56

## 2023-03-15 RX ADMIN — LEVETIRACETAM 500 MG: 500 TABLET, FILM COATED ORAL at 08:26

## 2023-03-15 RX ADMIN — POTASSIUM CHLORIDE 40 MEQ: 1500 TABLET, EXTENDED RELEASE ORAL at 08:26

## 2023-03-15 RX ADMIN — SODIUM CHLORIDE, PRESERVATIVE FREE 2 ML: 5 INJECTION INTRAVENOUS at 08:27

## 2023-03-15 RX ADMIN — HYDROMORPHONE HYDROCHLORIDE 1 MG: 1 INJECTION, SOLUTION INTRAMUSCULAR; INTRAVENOUS; SUBCUTANEOUS at 17:14

## 2023-03-15 RX ADMIN — HYDROMORPHONE HYDROCHLORIDE 1 MG: 1 INJECTION, SOLUTION INTRAMUSCULAR; INTRAVENOUS; SUBCUTANEOUS at 15:03

## 2023-03-15 RX ADMIN — HYDROMORPHONE HYDROCHLORIDE 1 MG: 1 INJECTION, SOLUTION INTRAMUSCULAR; INTRAVENOUS; SUBCUTANEOUS at 08:36

## 2023-03-15 ASSESSMENT — PAIN SCALES - GENERAL
PAINLEVEL_OUTOF10: 10
PAINLEVEL_OUTOF10: 4
PAINLEVEL_OUTOF10: 0
PAINLEVEL_OUTOF10: 10
PAINLEVEL_OUTOF10: 10
PAINLEVEL_OUTOF10: 4
PAINLEVEL_OUTOF10: 10

## 2023-03-16 LAB
ANION GAP SERPL CALC-SCNC: 8 MMOL/L (ref 7–19)
BASOPHILS # BLD: 0 K/MCL (ref 0–0.3)
BASOPHILS NFR BLD: 0 %
BUN SERPL-MCNC: 9 MG/DL (ref 6–20)
BUN/CREAT SERPL: 36 (ref 7–25)
BURR CELLS BLD QL SMEAR: ABNORMAL
CALCIUM SERPL-MCNC: 9 MG/DL (ref 8.4–10.2)
CHLORIDE SERPL-SCNC: 101 MMOL/L (ref 97–110)
CO2 SERPL-SCNC: 27 MMOL/L (ref 21–32)
CREAT SERPL-MCNC: 0.25 MG/DL (ref 0.51–0.95)
DEPRECATED RDW RBC: 50.3 FL (ref 39–50)
EOSINOPHIL # BLD: 0 K/MCL (ref 0–0.5)
EOSINOPHIL NFR BLD: 0 %
ERYTHROCYTE [DISTWIDTH] IN BLOOD: 14.7 % (ref 11–15)
FASTING DURATION TIME PATIENT: ABNORMAL H
GFR SERPLBLD BASED ON 1.73 SQ M-ARVRAT: >90 ML/MIN
GLUCOSE SERPL-MCNC: 175 MG/DL (ref 70–99)
HCT VFR BLD CALC: 44.9 % (ref 36–46.5)
HGB BLD-MCNC: 15.3 G/DL (ref 12–15.5)
LYMPHOCYTES # BLD: 0.5 K/MCL (ref 1–4.8)
LYMPHOCYTES NFR BLD: 6 %
MAGNESIUM SERPL-MCNC: 2.3 MG/DL (ref 1.7–2.4)
MCH RBC QN AUTO: 31.7 PG (ref 26–34)
MCHC RBC AUTO-ENTMCNC: 34.1 G/DL (ref 32–36.5)
MCV RBC AUTO: 93 FL (ref 78–100)
MONOCYTES # BLD: 0.5 K/MCL (ref 0.3–0.9)
MONOCYTES NFR BLD: 6 %
NEUTROPHILS # BLD: 7.2 K/MCL (ref 1.8–7.7)
NEUTS BAND NFR BLD: 10 % (ref 0–10)
NEUTS SEG NFR BLD: 78 %
NRBC BLD MANUAL-RTO: 0 /100 WBC
OVALOCYTES BLD QL SMEAR: ABNORMAL
PHOSPHATE SERPL-MCNC: 2.1 MG/DL (ref 2.4–4.7)
PLAT MORPH BLD: NORMAL
PLATELET # BLD AUTO: 106 K/MCL (ref 140–450)
POLYCHROMASIA BLD QL SMEAR: ABNORMAL
POTASSIUM SERPL-SCNC: 3.2 MMOL/L (ref 3.4–5.1)
POTASSIUM SERPL-SCNC: 3.3 MMOL/L (ref 3.4–5.1)
POTASSIUM SERPL-SCNC: 4.5 MMOL/L (ref 3.4–5.1)
RBC # BLD: 4.83 MIL/MCL (ref 4–5.2)
SARS-COV-2 RNA RESP QL NAA+PROBE: NOT DETECTED
SERVICE CMNT-IMP: NORMAL
SERVICE CMNT-IMP: NORMAL
SODIUM SERPL-SCNC: 133 MMOL/L (ref 135–145)
WBC # BLD: 8.2 K/MCL (ref 4.2–11)
WBC MORPH BLD: NORMAL

## 2023-03-16 PROCEDURE — 84100 ASSAY OF PHOSPHORUS: CPT | Performed by: NURSE PRACTITIONER

## 2023-03-16 PROCEDURE — 51798 US URINE CAPACITY MEASURE: CPT

## 2023-03-16 PROCEDURE — 10002803 HB RX 637: Performed by: INTERNAL MEDICINE

## 2023-03-16 PROCEDURE — 83735 ASSAY OF MAGNESIUM: CPT | Performed by: NURSE PRACTITIONER

## 2023-03-16 PROCEDURE — 36415 COLL VENOUS BLD VENIPUNCTURE: CPT | Performed by: NURSE PRACTITIONER

## 2023-03-16 PROCEDURE — 87635 SARS-COV-2 COVID-19 AMP PRB: CPT | Performed by: INTERNAL MEDICINE

## 2023-03-16 PROCEDURE — 84132 ASSAY OF SERUM POTASSIUM: CPT | Performed by: NURSE PRACTITIONER

## 2023-03-16 PROCEDURE — 80048 BASIC METABOLIC PNL TOTAL CA: CPT | Performed by: NURSE PRACTITIONER

## 2023-03-16 PROCEDURE — 10002800 HB RX 250 W HCPCS: Performed by: INTERNAL MEDICINE

## 2023-03-16 PROCEDURE — G0378 HOSPITAL OBSERVATION PER HR: HCPCS

## 2023-03-16 PROCEDURE — 96376 TX/PRO/DX INJ SAME DRUG ADON: CPT

## 2023-03-16 PROCEDURE — 10004651 HB RX, NO CHARGE ITEM: Performed by: EMERGENCY MEDICINE

## 2023-03-16 PROCEDURE — 10002803 HB RX 637: Performed by: NURSE PRACTITIONER

## 2023-03-16 PROCEDURE — 85027 COMPLETE CBC AUTOMATED: CPT | Performed by: NURSE PRACTITIONER

## 2023-03-16 PROCEDURE — 10002803 HB RX 637

## 2023-03-16 PROCEDURE — 13003289 HB OXYGEN THERAPY DAILY

## 2023-03-16 PROCEDURE — 84132 ASSAY OF SERUM POTASSIUM: CPT | Performed by: INTERNAL MEDICINE

## 2023-03-16 RX ORDER — POTASSIUM CHLORIDE 20 MEQ/1
40 TABLET, EXTENDED RELEASE ORAL ONCE
Status: CANCELLED | OUTPATIENT
Start: 2023-03-16 | End: 2023-03-16

## 2023-03-16 RX ORDER — POTASSIUM CHLORIDE 20 MEQ/1
40 TABLET, EXTENDED RELEASE ORAL ONCE
Status: COMPLETED | OUTPATIENT
Start: 2023-03-16 | End: 2023-03-16

## 2023-03-16 RX ADMIN — POTASSIUM CHLORIDE 40 MEQ: 1500 TABLET, EXTENDED RELEASE ORAL at 16:58

## 2023-03-16 RX ADMIN — LORAZEPAM 1 MG: 2 INJECTION INTRAMUSCULAR; INTRAVENOUS at 11:31

## 2023-03-16 RX ADMIN — HYDROMORPHONE HYDROCHLORIDE 1 MG: 1 INJECTION, SOLUTION INTRAMUSCULAR; INTRAVENOUS; SUBCUTANEOUS at 19:54

## 2023-03-16 RX ADMIN — SODIUM CHLORIDE, PRESERVATIVE FREE 2 ML: 5 INJECTION INTRAVENOUS at 21:10

## 2023-03-16 RX ADMIN — HYDROMORPHONE HYDROCHLORIDE 1 MG: 1 INJECTION, SOLUTION INTRAMUSCULAR; INTRAVENOUS; SUBCUTANEOUS at 11:31

## 2023-03-16 RX ADMIN — HYDROMORPHONE HYDROCHLORIDE 1 MG: 1 INJECTION, SOLUTION INTRAMUSCULAR; INTRAVENOUS; SUBCUTANEOUS at 14:14

## 2023-03-16 RX ADMIN — LEVETIRACETAM 500 MG: 500 TABLET, FILM COATED ORAL at 21:10

## 2023-03-16 RX ADMIN — DIBASIC SODIUM PHOSPHATE, MONOBASIC POTASSIUM PHOSPHATE AND MONOBASIC SODIUM PHOSPHATE 250 MG: 852; 155; 130 TABLET ORAL at 08:17

## 2023-03-16 RX ADMIN — SODIUM CHLORIDE, PRESERVATIVE FREE 2 ML: 5 INJECTION INTRAVENOUS at 08:17

## 2023-03-16 RX ADMIN — HYDROMORPHONE HYDROCHLORIDE 1 MG: 1 INJECTION, SOLUTION INTRAMUSCULAR; INTRAVENOUS; SUBCUTANEOUS at 00:26

## 2023-03-16 RX ADMIN — DIBASIC SODIUM PHOSPHATE, MONOBASIC POTASSIUM PHOSPHATE AND MONOBASIC SODIUM PHOSPHATE 250 MG: 852; 155; 130 TABLET ORAL at 11:34

## 2023-03-16 RX ADMIN — POTASSIUM CHLORIDE 40 MEQ: 1500 TABLET, EXTENDED RELEASE ORAL at 08:17

## 2023-03-16 RX ADMIN — LEVETIRACETAM 500 MG: 500 TABLET, FILM COATED ORAL at 08:17

## 2023-03-16 RX ADMIN — HYDROMORPHONE HYDROCHLORIDE 1 MG: 1 INJECTION, SOLUTION INTRAMUSCULAR; INTRAVENOUS; SUBCUTANEOUS at 17:03

## 2023-03-16 RX ADMIN — HYDROMORPHONE HYDROCHLORIDE 1 MG: 1 INJECTION, SOLUTION INTRAMUSCULAR; INTRAVENOUS; SUBCUTANEOUS at 08:13

## 2023-03-16 RX ADMIN — AMLODIPINE BESYLATE 10 MG: 10 TABLET ORAL at 08:17

## 2023-03-16 RX ADMIN — HYDROMORPHONE HYDROCHLORIDE 1 MG: 1 INJECTION, SOLUTION INTRAMUSCULAR; INTRAVENOUS; SUBCUTANEOUS at 05:26

## 2023-03-16 ASSESSMENT — PAIN SCALES - GENERAL
PAINLEVEL_OUTOF10: 10
PAINLEVEL_OUTOF10: 3
PAINLEVEL_OUTOF10: 3
PAINLEVEL_OUTOF10: 4
PAINLEVEL_OUTOF10: 10

## 2023-03-17 VITALS
RESPIRATION RATE: 18 BRPM | DIASTOLIC BLOOD PRESSURE: 86 MMHG | OXYGEN SATURATION: 95 % | SYSTOLIC BLOOD PRESSURE: 137 MMHG | TEMPERATURE: 98.6 F | HEART RATE: 128 BPM

## 2023-03-17 LAB — PHOSPHATE SERPL-MCNC: 2.5 MG/DL (ref 2.4–4.7)

## 2023-03-17 PROCEDURE — 10002803 HB RX 637

## 2023-03-17 PROCEDURE — 96376 TX/PRO/DX INJ SAME DRUG ADON: CPT

## 2023-03-17 PROCEDURE — 84100 ASSAY OF PHOSPHORUS: CPT | Performed by: NURSE PRACTITIONER

## 2023-03-17 PROCEDURE — 10004651 HB RX, NO CHARGE ITEM: Performed by: EMERGENCY MEDICINE

## 2023-03-17 PROCEDURE — 10002803 HB RX 637: Performed by: INTERNAL MEDICINE

## 2023-03-17 PROCEDURE — 10002800 HB RX 250 W HCPCS: Performed by: INTERNAL MEDICINE

## 2023-03-17 PROCEDURE — G0378 HOSPITAL OBSERVATION PER HR: HCPCS

## 2023-03-17 PROCEDURE — 13003289 HB OXYGEN THERAPY DAILY: Performed by: INTERNAL MEDICINE

## 2023-03-17 PROCEDURE — 36415 COLL VENOUS BLD VENIPUNCTURE: CPT | Performed by: NURSE PRACTITIONER

## 2023-03-17 RX ORDER — AMLODIPINE BESYLATE 10 MG/1
10 TABLET ORAL DAILY
Qty: 30 TABLET | Refills: 3 | Status: SHIPPED | OUTPATIENT
Start: 2023-03-18

## 2023-03-17 RX ORDER — ACETAMINOPHEN 325 MG/1
650 TABLET ORAL EVERY 4 HOURS PRN
Qty: 90 TABLET | Refills: 0 | Status: SHIPPED | OUTPATIENT
Start: 2023-03-17

## 2023-03-17 RX ADMIN — LEVETIRACETAM 500 MG: 500 TABLET, FILM COATED ORAL at 10:09

## 2023-03-17 RX ADMIN — HYDROMORPHONE HYDROCHLORIDE 1 MG: 1 INJECTION, SOLUTION INTRAMUSCULAR; INTRAVENOUS; SUBCUTANEOUS at 15:52

## 2023-03-17 RX ADMIN — HYDROMORPHONE HYDROCHLORIDE 1 MG: 1 INJECTION, SOLUTION INTRAMUSCULAR; INTRAVENOUS; SUBCUTANEOUS at 01:29

## 2023-03-17 RX ADMIN — HYDROMORPHONE HYDROCHLORIDE 1 MG: 1 INJECTION, SOLUTION INTRAMUSCULAR; INTRAVENOUS; SUBCUTANEOUS at 03:21

## 2023-03-17 RX ADMIN — LORAZEPAM 1 MG: 2 INJECTION INTRAMUSCULAR; INTRAVENOUS at 03:21

## 2023-03-17 RX ADMIN — SODIUM CHLORIDE, PRESERVATIVE FREE 2 ML: 5 INJECTION INTRAVENOUS at 10:09

## 2023-03-17 RX ADMIN — HYDROMORPHONE HYDROCHLORIDE 1 MG: 1 INJECTION, SOLUTION INTRAMUSCULAR; INTRAVENOUS; SUBCUTANEOUS at 10:06

## 2023-03-17 RX ADMIN — HYDROMORPHONE HYDROCHLORIDE 1 MG: 1 INJECTION, SOLUTION INTRAMUSCULAR; INTRAVENOUS; SUBCUTANEOUS at 18:05

## 2023-03-17 RX ADMIN — AMLODIPINE BESYLATE 10 MG: 10 TABLET ORAL at 10:09

## 2023-03-17 RX ADMIN — HYDROMORPHONE HYDROCHLORIDE 1 MG: 1 INJECTION, SOLUTION INTRAMUSCULAR; INTRAVENOUS; SUBCUTANEOUS at 12:32

## 2023-03-17 RX ADMIN — LORAZEPAM 1 MG: 2 INJECTION INTRAMUSCULAR; INTRAVENOUS at 17:35

## 2023-03-17 RX ADMIN — HYDROMORPHONE HYDROCHLORIDE 1 MG: 1 INJECTION, SOLUTION INTRAMUSCULAR; INTRAVENOUS; SUBCUTANEOUS at 07:02

## 2023-03-17 ASSESSMENT — PAIN SCALES - GENERAL
PAINLEVEL_OUTOF10: 10
PAINLEVEL_OUTOF10: 10

## (undated) NOTE — LETTER
Your patient was recently seen at the [facility] for a hospital follow-up visit. The visit note is attached. Please contact the clinic with any questions at [phone].     Thank you,  PABLO Rodas